# Patient Record
Sex: FEMALE | Race: WHITE | NOT HISPANIC OR LATINO | Employment: FULL TIME | URBAN - METROPOLITAN AREA
[De-identification: names, ages, dates, MRNs, and addresses within clinical notes are randomized per-mention and may not be internally consistent; named-entity substitution may affect disease eponyms.]

---

## 2017-05-16 ENCOUNTER — ALLSCRIPTS OFFICE VISIT (OUTPATIENT)
Dept: OTHER | Facility: OTHER | Age: 27
End: 2017-05-16

## 2017-05-16 DIAGNOSIS — R10.2 PELVIC AND PERINEAL PAIN: ICD-10-CM

## 2017-05-16 DIAGNOSIS — Z01.419 ENCOUNTER FOR GYNECOLOGICAL EXAMINATION WITHOUT ABNORMAL FINDING: ICD-10-CM

## 2017-05-16 DIAGNOSIS — Z11.3 ENCOUNTER FOR SCREENING FOR INFECTIONS WITH PREDOMINANTLY SEXUAL MODE OF TRANSMISSION: ICD-10-CM

## 2017-05-19 ENCOUNTER — GENERIC CONVERSION - ENCOUNTER (OUTPATIENT)
Dept: OTHER | Facility: OTHER | Age: 27
End: 2017-05-19

## 2017-05-19 LAB
A. VAGINALIS BY PCR (HISTORICAL): NOT DETECTED
A.VAGINALIS LOG (CELL/ML) (HISTORICAL): <3.25
BVAB 2 (HISTORICAL): NOT DETECTED
C. ALBICANS, DNA OR PCR (HISTORICAL): NOT DETECTED
CANDIDA GENUS (HISTORICAL): NOT DETECTED
CHLAMYDIA TRACHOMATIS BY MOL. METHOD (HISTORICAL): NOT DETECTED
GARDNERELLA BY MOL. METHOD (HISTORICAL): <3.25
GARDNERELLA BY MOL. METHOD (HISTORICAL): NOT DETECTED
GC BY MOL. METHOD (HISTORICAL): NOT DETECTED
HERPES SIMPLEX TYPING (HISTORICAL): NOT DETECTED
HERPES SIMPLEX TYPING (HISTORICAL): NOT DETECTED
LACTOBACILLUS (SPECIES) (HISTORICAL): DETECTED
LACTOBACILLUS (SPECIES) (HISTORICAL): NORMAL
MEGASPHAERA TYPE 1 (HISTORICAL): NOT DETECTED
MYCOPLASMA GENITALIUM BY MOLECULAR METHOD (HISTORICAL): NOT DETECTED
TRICHOMONAS (HISTORICAL): NOT DETECTED
UREAPLASMA (HISTORICAL): NOT DETECTED

## 2017-05-22 ENCOUNTER — HOSPITAL ENCOUNTER (OUTPATIENT)
Dept: RADIOLOGY | Facility: HOSPITAL | Age: 27
Discharge: HOME | End: 2017-05-22
Payer: COMMERCIAL

## 2017-05-22 DIAGNOSIS — R10.2 PELVIC AND PERINEAL PAIN: ICD-10-CM

## 2017-05-22 PROCEDURE — 76856 US EXAM PELVIC COMPLETE: CPT

## 2017-05-22 PROCEDURE — 76830 TRANSVAGINAL US NON-OB: CPT

## 2017-05-24 ENCOUNTER — GENERIC CONVERSION - ENCOUNTER (OUTPATIENT)
Dept: OTHER | Facility: OTHER | Age: 27
End: 2017-05-24

## 2017-05-24 ENCOUNTER — DOCTOR'S OFFICE (OUTPATIENT)
Dept: URBAN - METROPOLITAN AREA CLINIC 137 | Facility: CLINIC | Age: 27
Setting detail: OPHTHALMOLOGY
End: 2017-05-24
Payer: COMMERCIAL

## 2017-05-24 DIAGNOSIS — H52.13: ICD-10-CM

## 2017-05-24 PROCEDURE — 92014 COMPRE OPH EXAM EST PT 1/>: CPT | Performed by: OPHTHALMOLOGY

## 2017-05-24 ASSESSMENT — REFRACTION_CURRENTRX
OS_OVR_VA: 20/
OD_OVR_VA: 20/
OS_OVR_VA: 20/
OS_OVR_VA: 20/
OD_OVR_VA: 20/
OD_OVR_VA: 20/

## 2017-05-24 ASSESSMENT — REFRACTION_OUTSIDERX
OS_VA1: 20/20
OS_VA2: 20/
OU_VA: 20/20
OD_VA3: 20/
OS_VA3: 20/
OD_VA2: 20/
OS_SPHERE: -4.50
OD_SPHERE: -4.75
OS_AXIS: 90
OD_VA1: 20/20
OS_CYLINDER: +0.50

## 2017-05-24 ASSESSMENT — REFRACTION_MANIFEST
OS_VA1: 20/20
OU_VA: 20/
OS_VA3: 20/
OD_CYLINDER: SPH
OD_VA1: 20/20
OD_VA2: 20/
OD_VA3: 20/
OS_VA2: 20/
OS_AXIS: 095
OS_CYLINDER: +1.00
OD_VA2: 20/
OS_SPHERE: -4.75
OS_VA2: 20/
OD_VA3: 20/
OS_VA3: 20/
OD_SPHERE: -4.75
OD_VA1: 20/
OS_VA1: 20/
OU_VA: 20/

## 2017-05-24 ASSESSMENT — REFRACTION_AUTOREFRACTION
OS_AXIS: 102
OS_CYLINDER: +1.00
OD_SPHERE: -4.25
OD_CYLINDER: SPH
OS_SPHERE: -4.50

## 2017-05-24 ASSESSMENT — CONFRONTATIONAL VISUAL FIELD TEST (CVF)
OD_FINDINGS: FULL
OS_FINDINGS: FULL

## 2017-05-24 ASSESSMENT — VISUAL ACUITY
OS_BCVA: 20/20
OD_BCVA: 20/20

## 2017-05-24 ASSESSMENT — SPHEQUIV_DERIVED
OS_SPHEQUIV: -4.25
OS_SPHEQUIV: -4

## 2017-05-31 ENCOUNTER — ALLSCRIPTS OFFICE VISIT (OUTPATIENT)
Dept: OTHER | Facility: OTHER | Age: 27
End: 2017-05-31

## 2017-06-06 ENCOUNTER — ALLSCRIPTS OFFICE VISIT (OUTPATIENT)
Dept: OTHER | Facility: OTHER | Age: 27
End: 2017-06-06

## 2017-09-08 ENCOUNTER — ALLSCRIPTS OFFICE VISIT (OUTPATIENT)
Dept: OTHER | Facility: OTHER | Age: 27
End: 2017-09-08

## 2017-09-08 DIAGNOSIS — N92.1 EXCESSIVE AND FREQUENT MENSTRUATION WITH IRREGULAR CYCLE: ICD-10-CM

## 2017-09-08 DIAGNOSIS — N94.6 DYSMENORRHEA: ICD-10-CM

## 2017-10-26 NOTE — PROGRESS NOTES
Assessment  1  Menorrhagia with irregular cycle (626 2) (N92 1)   2  Dysmenorrhea (625 3) (N94 6)    Plan  Dysmenorrhea, Menorrhagia with irregular cycle    · (1) T4, FREE; Status:Active; Requested CEE:[id number];    Perform:[facility]; WLX:[id number]; Ordered; For:Dysmenorrhea, Menorrhagia with irregular cycle; Ordered By:Mariana Sheppard;   · (1) TSH; Status:Active; Requested GKC:28RZN8415;    Perform:[facility]; DLH:[id number]; Ordered; For:Dysmenorrhea, Menorrhagia with irregular cycle; Ordered By:Dinh Sheppard; Menorrhagia with irregular cycle    · Balziva 0 4-35 MG-MCG Oral Tablet; Take one tablet po daily at the same time  Avoid placebo pill  x 2 packs then take all pills including placebos in thrid pill pack   Rx By: Arnoldo Bianchi; Dispense: 0 Days ; #:3 X 28 Tablet Disp Pack; Refill: 0;For: Menorrhagia with irregular cycle; ADRIAN = N; Verified Transmission to [facility]/PHARMACY #[id number] Last Updated By: System, [employer]Script[employer]; 9/8/2017 2:32:18 PM   · Follow-up PRN Evaluation and Treatment  Follow-up  Status: Complete  Done:  83TPO3019   Ordered; For: Menorrhagia with irregular cycle; Ordered By: Arnoldo Bianchi Performed:  Due: 51MDN1931   · Follow-up visit in 3 months Evaluation and Treatment  Follow-up  Status: Hold For -  Scheduling  Requested for: 42Izg9435   Ordered; For: Menorrhagia with irregular cycle; Ordered By: Arnoldo Bianchi Performed:  Due: 17EPN1124    Discussion/Summary  Discussion Summary:   Sign records release to get copy of her most recent [facility] ER visit  TSH, FT4  Start balziva today-discussed extended cycling  Ibuprofen 600 mg by mouth with onset of bleeding or cramping, whichever is first  Take second dose of ibuprofen 400 mg by mouth with food and repeat every 6 hours x 3 days  RTO in 3 months  Complete HIV, RPR that were ordered previously  Chief Complaint  Chief Complaint Free Text Note Form: pt here for heavy periods with severe cramping   pt states periods are usually heavier the first 2 days of cycle and gets lighter towards the end or completely stops  It is becoming an issue with her job because she has to call off or leave due to the pain  History of Present Illness  HPI: Patient here for a problem visit  Typically monthly menses but not always when expected  Bleeds x 2-7 days  Heavy flow x 2 days changing a saturated pad and clothes 5 times per day  Admits to cramping rating pain 10/10 with no relief from a list of medications  States oxycodone with tylenol is the only medication that relieves the pain  After the first two heavy flow days the flow lessens and pain ends  Last sexually active 2 months ago  Never completed the HIV and RPR testing because she lost the lab slip  Admits to clear to milky white vaginal discharge x 1 month  No malodor  Denies pelvic pain when not menstruating  Was seen at [facility] ER 9/5/17 on day one of her menses  Was told her blood count was normal and the meds they gave her and prescribed her (tramadol) did not relieve her pain  Active Problems  1  Bacteriuria (791 9) (R82 71)   2  Encounter for annual routine gynecological examination (V72 31) (Z01 419)   3  HSV infection (054 9) (B00 9)   4  Leukorrhea (623 5) (N89 8)   5  Pelvic pain in female (625 9) (R10 2)   6  Screening for STD (sexually transmitted disease) (V74 5) (Z11 3)   7  Sprain of left wrist, initial encounter (842 00) (S63 502A)   8  UTI (urinary tract infection), uncomplicated (848 8) (E35 4)   9  Vaginal discharge (623 5) (N89 8)    Past Medical History  1  History of Bacterial vaginosis (616 10,041 9) (N76 0,B96 89)   2  History of Blood type A+ (V49 89) (Z67 10)   3  History of Chlamydia (079 98) (A74 9)   4  History of gonorrhea (V12 09) (Z86 19)   5  History of herpes simplex infection (V12 09) (Z86 19)   6  History of herpes simplex type 2 infection (V12 09) (Z86 19)   7   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)  Active Problems And Past Medical History Reviewed: The active problems and past medical history were reviewed and updated today  Surgical History  1  History of Oral Surgery Tooth Extraction [place] Tooth   2  History of Surgically Induced  - By Dilation And Curettage   3  History of Tubal Ligation  Surgical History Reviewed: The surgical history was reviewed and updated today  Family History  Mother    1  Family history of malignant neoplasm of breast (V16 3) (Z80 3)  Father    2  Family history of hypertension (V17 49) (Z82 49)  Son    3  Family history of asthma (V17 5) (Z82 5)  Family History Reviewed: The family history was reviewed and updated today  Social History   · Alcohol drinker (V49 89) (Z78 9)   · Daily caffeine consumption, 1 serving a day   · Exercises 3 to 4 times per week (V49 89) (Z78 9)   · Ex-smoker (V15 82) (T02 490)   · No drug use   · History of Smoker (305 1) (F17 200)   · Three children  Social History Reviewed: The social history was reviewed and updated today  Current Meds   1  ValACYclovir HCl - 500 MG Oral Tablet; TAKE 1 TABLET TWICE DAILY; Therapy: 46ZDV9773 to (Evaluate:55Trz3668)  Requested for: 26AKJ9034; Last   Rx:93Hem5853 Ordered   2  ValACYclovir HCl - 500 MG Oral Tablet; Take one tablet po daily; Therapy: 47LFV6414 to (Last Rx:77Wnm3138)  Requested for: 00WCG5265 Ordered   3  ValACYclovir HCl - 500 MG Oral Tablet; Take one tablet po daily; Therapy: 62TTX7140 to (Last TC:38WJY5850)  Requested for: 46HMZ3630 Ordered  Medication List Reviewed: The medication list was reviewed and updated today  Allergies  1  aspirin    Vitals  Vital Signs    Recorded: 2017 02:02PM   Heart Rate 60   Systolic 963   Diastolic 62   Height 5 ft    Weight 165 lb    BMI Calculated 32 22   BSA Calculated 1 72   O2 Saturation 98   LMP [id number]     Physical Exam    Constitutional   General appearance: No acute distress, well appearing and well nourished      Genitourinary   External genitalia: Normal and no lesions appreciated  Vagina: Abnormal  -- bloody discharge  Urethra: Normal     Urethral meatus: Normal     Bladder: Normal, soft, non-tender and no prolapse or masses appreciated  Cervix: Normal, no palpable masses  -- smooth  Uterus: Normal, non-tender, not enlarged, and no palpable masses  -- AV  Adnexa/parametria: Normal, non-tender and no fullness or masses appreciated  -- NT NP  Anus, perineum, and rectum: Normal sphincter tone, no masses, and no prolapse  Visually normal   Psychiatric   Orientation to person, place, and time: Normal     Mood and affect: Normal        Signatures   Electronically signed by :  LEN Bustillos; Sep  8 2017  2:36PM EST                       (Author)    Electronically signed by : LORRI Abraham ; Sep 10 2017  5:39PM EST                       (Author)

## 2017-12-08 ENCOUNTER — ALLSCRIPTS OFFICE VISIT (OUTPATIENT)
Dept: OTHER | Facility: OTHER | Age: 27
End: 2017-12-08

## 2017-12-09 NOTE — PROGRESS NOTES
Assessment   1  Menorrhagia with irregular cycle (626 2) (N92 1)   2  Dysmenorrhea (625 3) (N94 6)   3  HSV infection (054 9) (B00 9)    Plan   Dysmenorrhea    · Follow-up PRN Evaluation and Treatment  Follow-up  Status: Complete  Done:    21ZRW2519   Ordered; For: Dysmenorrhea; Ordered By: Rohit Perrin Performed:  Due: 53JZH5368   · Follow-up visit in 5 months Evaluation and Treatment  Follow-up  Status: Complete     Done: 09BBI6382   Ordered; For: Dysmenorrhea; Ordered By: Rohit Perrin Performed:  Due: 50BYE4886; Last Updated By: Yannick Mcdaniel; 12/8/2017 9:33:30 AM  HSV infection, Leukorrhea    · ValACYclovir HCl - 500 MG Oral Tablet (Valtrex); TAKE 1 TABLET DAILY   Rx By: Rohit Perrin; Dispense: 30 Days ; #:30 Tablet; Refill: 6;For: HSV infection, Leukorrhea; ADRIAN = N; Verified Transmission to [facility]/PHARMACY #[id number] Last Updated By: System, SureScripts; 12/8/2017 9:28:36 AM  Menorrhagia with irregular cycle    · Balziva 0 4-35 MG-MCG Oral Tablet; Take one tablet po daily at the same time  Avoid placebo pill  x 2 packs then take all pills including placebos in thrid pill pack   Rx By: Rohit Perrin; Dispense: 0 Days ; #:1 X 28 Tablet Disp Pack; Refill: 5;For: Menorrhagia with irregular cycle; ADRIAN = N; Verified Transmission to [facility]/PHARMACY #2254 Msg to Pharmacy: Patient will need refill prior to the end of the pill pack due to extended cycling as stated above ; Last Updated By: System [employer]; 12/8/2017 9:28:35 AM    Discussion/Summary   Discussion Summary:    Take suppressive HSV treatment of valacyclovir  Instructions given  Continue ASMITA extended cycling  Note to pharmacy to allow her to fill medication prior to end of the month  RTO for annual visit  Counseling Documentation With Imm: The patient was counseled regarding diagnostic results,-- instructions for management,-- prognosis,-- patient and family education,-- importance of compliance with treatment   total time of encounter was 18 minutes-- and-- 11 minutes was spent counseling  Chief Complaint   Chief Complaint Free Text Note Form: Pt here for 3 month follow up on her Severino Morley for heavy bleeding  History of Present Illness   HPI: Patient here for birth control follow up  States she tried to extended cycle on balziva but the pharmacy would not fill her pill pack early and she ended up bleeding early each month  Monthly menses x 5-12 day  Intermenstrual frequent bleeding  Cramping with each bleed fucking killer cramps  No meds taken  It doesnât help so why bother  Normal TSH 9/17  Normal vaginal discharge when not bleeding  Recent HSV outbreaks and requesting refill on valacyclovir  Partner recently tested positive for HSV2 which is very upsetting to her  Review of Systems   Focused-Female:      Constitutional: No fever, no chills, feels well, no tiredness, no recent weight gain or loss  Genitourinary: dysmenorrhea-- and-- unexplained vaginal bleeding, but-- no complaints of dysuria, no incontinence, no pelvic pain, no dysmenorrhea, no vaginal discharge or abnormal vaginal bleeding,-- no dysuria-- and-- no vaginal discharge  Musculoskeletal: no complaints of arthralgia, no myalgia, no joint swelling or stiffness, no limb pain or swelling  Integumentary: no complaints of skin rash or lesion, no itching or dry skin, no skin wounds  ROS Reviewed:    ROS reviewed  Active Problems   1  Bacteriuria (791 9) (R82 71)   2  Dysmenorrhea (625 3) (N94 6)   3  Encounter for annual routine gynecological examination (V72 31) (Z01 419)   4  HSV infection (054 9) (B00 9)   5  Leukorrhea (623 5) (N89 8)   6  Menorrhagia with irregular cycle (626 2) (N92 1)   7  Pelvic pain in female (625 9) (R10 2)   8  Screening for STD (sexually transmitted disease) (V74 5) (Z11 3)   9  Sprain of left wrist, initial encounter (842 00) (S63 502A)   10  UTI (urinary tract infection), uncomplicated (950 9) (I03 5)   11   Vaginal discharge (623  5) (N89 8)    Past Medical History   1  History of Bacterial vaginosis (616 10,041 9) (N76 0,B96 89)   2  History of Blood type A+ (V49 89) (Z67 10)   3  History of Chlamydia (079 98) (A74 9)   4  History of gonorrhea (V12 09) (Z86 19)   5  History of herpes simplex infection (V12 09) (Z86 19)   6  History of herpes simplex type 2 infection (V12 09) (Z86 19)   7  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)  Active Problems And Past Medical History Reviewed: The active problems and past medical history were reviewed and updated today  Surgical History   1  History of Oral Surgery Tooth Extraction [place] Tooth   2  History of Surgically Induced  - By Dilation And Curettage   3  History of Tubal Ligation    Family History   Mother    1  Family history of malignant neoplasm of breast (V16 3) (Z80 3)  Father    2  Family history of hypertension (V17 49) (Z82 49)  Son    3  Family history of asthma (V17 5) (Z82 5)    Social History    · Alcohol drinker (V49 89) (Z78 9)   · Daily caffeine consumption, 1 serving a day   · Exercises 3 to 4 times per week (V49 89) (Z78 9)   · Ex-smoker (V15 82) (D49 267)   · No drug use   · History of Smoker (305 1) (F17 200)   · Three children  Social History Reviewed: The social history was reviewed and updated today  Current Meds    1  Balziva 0 4-35 MG-MCG Oral Tablet; Take one tablet po daily at the same time  Avoid     placebo pill  x 2 packs then take all pills including placebos in thrid pill pack; Therapy: 45EYL3826 to (Last Rx:37Zjx0609)  Requested for: 04Rlm5027 Ordered   2  ValACYclovir HCl - 500 MG Oral Tablet; TAKE 1 TABLET TWICE DAILY; Therapy: 62CIH8368 to (Evaluate:73Xqj1744)  Requested for: 48KWY8214; Last     Rx:62Peh8225 Ordered  Medication List Reviewed: The medication list was reviewed and updated today         Allergies   1  aspirin    Vitals   Vital Signs    Recorded: 70FUC6672 08:54AM   Heart Rate 70   Systolic 841, LUE, Sitting Diastolic 68, LUE, Sitting   Height 5 ft    Weight 170 lb    BMI Calculated 33 2   BSA Calculated 1 74   LMP 25PZH4654     Physical Exam   deferred         Future Appointments      Date/Time Provider Specialty Site   05/24/2018 02:40 PM Rajeev Mosqueda[address] [facility]  [facility]    Electronically signed by : LEN Ayers; Dec  8 2017  9:24AM EST                       (Author)     Electronically signed by : LEN Ayers; Dec  8 2017  9:29AM EST                       (Author)     Electronically signed by : LORRI Liriano ; Dec  8 2017  3:27PM EST                       (Author)     Electronically signed by :  LEN Ayers; Dec 27 2017  3:59PM EST                       (Author)     Electronically signed by : LORRI Liriano ; Dec 27 2017  5:26PM EST                       (Author)

## 2018-01-10 NOTE — RESULT NOTES
Verified Results  (B) C/V/U EXPANDED W/O PAP W/O HPV PLUS (NON-[place]) 66ZJQ3451 03:41PM [place]      Test Name Result Flag Reference   Chlamyd  Trach by Paula Not Detected     SPECIMEN SOURCE:      C/V/U EXPANDED W/O PAP W/O HPV PLUS (NON-[place])  CLINICAL INFORMATION: Provided Diagnosis Codes: N89 8  MOLECULAR               INTERPRETATION:       Results are indeterminate for Bacterial Vaginosis                         (BV)  MOLECULAR COMMENT:    Both Lactobacillus and anaerobic bacterial DNA                         (from G  vaginalis, and/or A  vaginae,                         Megasphaera, BVAB2)   is detected  There is no                         predominance of either lactobacillus or tested                         anaerobic bacterial DNA  BV cannot be excluded  GC BY MULTIPLEX PCR Not Detected     Ureaplasma By Multiplex PCR Detected A    Mycoplasma Genitalium Multiplex Not Detected     Trichomonas By Mult PCR Not Detected     Herpes Simplex I Multi  Not Detected     Herpes II by Multiplex Not Detected     CANDIDA GENUS Detected A    C ALBICANS BY PCR Detected A    G  VAGINALIS BY PCR Detected A    A  VAGINALIS BY PCR Not Detected     LACTOBACILLUS (SPECIES) BY PCR Detected     MEGASPHAERA TYPE 1 PCR Not Detected     BVAB2 BY PCR Not Detected     LACTOBACILLUS SP  LOG (CELLS/mL) 3 25 - 5 25     G  VAGINALS BY RT-PCR 3 25 - 5 25 A    A VAGINALIS LOG (CELL/ML) <3 25         Plan  Vaginal discharge    · Azithromycin 500 MG Oral Tablet; Take both tablets po at once   · Gyne-Lotrimin 1 % Vaginal Cream; INSERT 1 APPLICATORFUL  INTRAVAGINALLY AT BEDTIME NIGHTLY x 7 nights   · MetroNIDAZOLE 500 MG Oral Tablet; TAKE 1 TABLET TWICE DAILY UNTIL  FINISHED  No ETOH x 8 days

## 2018-01-11 NOTE — RESULT NOTES
Verified Results  (B) C/V/U EXPANDED W/O PAP W/O HPV PLUS (NON-NY) [id number] 12:00AM Mike Davis     Test Name Result Flag Reference   Atopobium Vaginae (NON-[place]) Not Detected     SPECIMEN SOURCE:      C/V/U EXPANDED W/O PAP W/O HPV PLUS (NON-[place])  CLINICAL INFORMATION: Provided Diagnosis Codes: N89 8  MOLECULAR               INTERPRETATION:       Results do not favor Bacterial Vaginosis (BV)  MOLECULAR COMMENT:    Lactobacillus DNA is detected  No anaerobic                          bacterial DNA (from G  vaginalis, A  vaginae,                         Megasphaera, BVAB2) is detected  Chlamydia Trachomatis by Multiplex PCR Not Detected     GC BY MULTIPLEX PCR Not Detected     Ureaplasma By Multiplex PCR Detected A    Mycoplasma Genitalium By Multiplex PCR Not Detected     Trichomonas By Multiplex PCR Not Detected     Gardnerella by Multiplex PCR (NON-[place]) Not Detected     Herpes Simplex I by Multiplex (Non [place]) Not Detected     Herpes Simplex II by Multiplex (Non [place]) Not Detected     BVAB2 [place] Not Detected     LACTOBACILLUS SPECIES NON-[place] Detected     MEGASPHAERA TYPE-1 Not Detected     CANDIDA GENUS Not Detected     C ALBICANS BY PCR Not Detected     ATOPOBIUM VAGINAE (NON-[place]) (4,6,7)  CHLAMYDIA TRACHOMATIS BY MULTIPLEX PCR (1,6,7)  GC BY MULTIPLEX PCR (1,6,7)  UREAPLASMA BY MULTIPLEX PCR (1,2,6,7)  MYCOPLASMA GENITALIUM BY MULTIPLEX PCR (1,2,6,7)  TRICHOMONAS BY MULTIPLEX PCR (1,6,7)  GARDNERELLA BY MULTIPLEX PCR (NON-[place]) (4,6,7)  HERPES SIMPLEX I BY MULTIPLEX (NON [place]) (1,6,7)  HERPES SIMPLEX II BY MULTIPLEX (NON [place]) (1,6,7)  BVAB2 NON-[place] (4,6,7)  LACTOBACILLUS SPECIES NON-[place] (4,6,7)  MEGASPHAERA TYPE-1 (4,6,7)  CANDIDA GENUS (3,5,6,7)  C ALBICANS BY PCR (3,5,6,7)  (1)  This test is an in vitro test for the detection of sexually transmitted   infections (STIs) in clinical specimens    The test utilizes   amplification of target DNA by the Polymerase Chain Reaction (PCR)   based on dual priming oligonucleotide technology and detects STI DNA  (2)  Mycoplasma genitalium is a causative agent of Nongonoccocal urethritis   (JURGEN) in men and cervicitis in women  Ureaplasma spp  is associated   with JURGEN in men  (3)  The common causes of vulvovaginal candidiasis include Candida albicans,   Candida tropicalis, Candida glabrata, Candida parapsilosis, Candida   dubliniensis, Gretchen krusei and a few other species that colonize the   vagina  In several studies, non-albicans species such as tropicalis,   krusei and glabrata have demonstrated resistance to fluconazole and   other related azole antifungals  Using comparable data from another   assay (BD [employer] VPIII microbial identification test), molecular   testing using liquid-based cytology specimens in general for Candida   may not be as sensitive as culture or visual identification  (4)  Target DNA for these organisms was amplified in a multiplex polymerase   chain reaction (PCR) and amplicons were  and identified by   capillary electrophoresis  (5)  This test utilizes amplification of target DNA for Candida genus and   selected Candida species by Polymerase Chain Reaction (PCR)  The limit   of detection for these assays is 0 9 pg for Candida genus, 9 pg for C    albicans, 10 pg for C  dubliniensis,  5 pg for C  glabrata, 20 pg for   C  krusei, 20 pg for C  parapsilosis, and 5 pg for C  tropicalis  (6)  This test was evaluated and its performance characteristics determined   by [facility]  It has not been cleared or approved by   the [place]  Food and Drug Administration  The FDA has determined that   such clearance or approval is not necessary  [facility]   is certified under the Clinical Laboratory Improvement Act of 1988   (CLIA) as qualified to perform high complexity clinical testing   (7)  Results should be interpreted together with past and current clinical   and laboratory data         Plan  Vaginal discharge    · Azithromycin 500 MG Oral Tablet;  Take both tablets po at once

## 2018-01-13 VITALS
HEIGHT: 60 IN | HEART RATE: 60 BPM | WEIGHT: 165 LBS | HEIGHT: 60 IN | SYSTOLIC BLOOD PRESSURE: 102 MMHG | BODY MASS INDEX: 35.34 KG/M2 | SYSTOLIC BLOOD PRESSURE: 106 MMHG | OXYGEN SATURATION: 98 % | BODY MASS INDEX: 32.39 KG/M2 | HEART RATE: 66 BPM | OXYGEN SATURATION: 98 % | DIASTOLIC BLOOD PRESSURE: 64 MMHG | WEIGHT: 180 LBS | DIASTOLIC BLOOD PRESSURE: 62 MMHG

## 2018-01-13 NOTE — RESULT NOTES
Verified Results  (B) C/V/U EXPANDED W/O PAP W/O HPV PLUS (NON-[place]) [id number] 11:32AM Rajeev Mosqueda     Test Name Result Flag Reference   Herpes Simplex I Multi  Not Detected     Herpes II by Multiplex Not Detected     [facility]yd  [facility]ach [facility] Not Detected     SPECIMEN SOURCE:      C/V/U EXPANDED W/O PAP W/O HPV PLUS (NON-[place])  CLINICAL INFORMATION: Provided Diagnosis Codes: Z11 3,N89 8  MOLECULAR               INTERPRETATION:       Results do not favor Bacterial Vaginosis (BV)  MOLECULAR COMMENT:    Lactobacillus DNA is detected  No anaerobic                          bacterial DNA (from G  vaginalis, A  vaginae,                         Megasphaera, BVAB2) is detected  GC BY MULTIPLEX PCR Not Detected     Ureaplasma By Multiplex PCR Not Detected     Mycoplasma Genitalium Multiplex Not Detected     Trichomonas By Mult PCR Not Detected     CANDIDA GENUS Not Detected     C ALBICANS BY PCR Not Detected     G  VAGINALIS BY PCR Not Detected     A  VAGINALIS BY PCR Not Detected     LACTOBACILLUS (SPECIES) BY PCR Detected     MEGASPHAERA TYPE 1 PCR Not Detected     BVAB2 BY PCR Not Detected     LACTOBACILLUS SP  LOG (CELLS/mL) 3 25 - 5 25     G  VAGINALS BY RT-PCR <3 25     A VAGINALIS LOG (CELL/ML) <3 25     CHLAMYDIA TRACHOMATIS BY MULTIPLEX PCR (1,6,7)  GC BY MULTIPLEX PCR (1,6,7)  UREAPLASMA BY MULTIPLEX PCR (1,2,6,7)  MYCOPLASMA GENITALIUM BY MULTIPLEX PCR (1,2,6,7)  TRICHOMONAS BY MULTIPLEX PCR (1,6,7)  HERPES SIMPLEX I BY MULTIPLEX (NON [place]) (1,6,7)  HERPES SIMPLEX II BY MULTIPLEX (NON [place]) (1,6,7)  CANDIDA GENUS (3,4,6,7)  C ALBICANS BY PCR (3,4,6,7)  G  VAGINALIS BY RT-PCR (5,6,8)  A  VAGINALIS BY RT-PCR (5,6,8)  LACTOBACILLUS (SPECIES) BY PCR (5,6,8)  MEGASPHAERA TYPE 1 BY RT PCR (5,6,8)  BVAB2 BY RT-PCR (5,6,8)  LACTOBACILLUS SP  LOG (CELLS/mL) (5,6,8)  G  VAGINALIS LOG (CELLS/mL) (5,6,8)  A   VAGINALIS LOG (CELLS/ML) (5,6,8)  (1)  This test is an in vitro test for the detection of sexually transmitted infections (STIs) in clinical specimens  The test utilizes   amplification of target DNA by the Polymerase Chain Reaction (PCR)   based on dual priming oligonucleotide technology and detects STI DNA  (2)  Mycoplasma genitalium is a causative agent of Nongonoccocal urethritis   (JURGEN) in men and cervicitis in women  Ureaplasma spp  is associated   with JURGEN in men  (3)  The common causes of vulvovaginal candidiasis include Candida albicans,   Candida tropicalis, Candida glabrata, Candida parapsilosis, Candida   dubliniensis, Gretchen krusei and a few other species that colonize the   vagina  In several studies, non-albicans species such as tropicalis,   krusei and glabrata have demonstrated resistance to fluconazole and   other related azole antifungals  Using comparable data from another   assay (BD [employer] VPIII microbial identification test), molecular   testing using liquid-based cytology specimens in general for Candida   may not be as sensitive as culture or visual identification  (4)  This test utilizes amplification of target DNA for Candida genus and   selected Candida species by Polymerase Chain Reaction (PCR)  The limit   of detection in the genus panel is 100 copies for Candida genus and for   C  albicans  The limit of detection in the species panel is 10 copies   for C  albicans, C  dubliniensis, C  glabrata, C  krusei, C    parapsilosis, and C  tropicalis  Saccharomyces cerevisiae shares   significant homology with Candida genus and may cross react and give a   presumptive positive result; however, S  cerevisiae is rarely seen in   the cervicovaginal microbiome, but may be seen in the GI tract  Saccharomyces cerevisiae may have an interfering reaction with C    albicans in the species panel, resulting in lower amplification of the   C  albicans    (5)  This assay utilizes quantitative real time PCR (multiple detection   temperatures technology, MuDT(TM)) in liquid cytology specimens to quantify the levels of nucleic acid of Lactobacillus species, G    vaginalis and A  vaginae, and also detects nucleic acid from several   obligate anaerobes associated with Bacterial Vaginosis (BV)   (6)  This test was evaluated and its performance characteristics determined   by [email or web address]  It has not been cleared or approved by   the [place]  Food and Drug Administration  The FDA has determined that   such clearance or approval is not necessary  [email or web address]   is certified under the Clinical Laboratory Improvement Act of 1988   (CLIA) as qualified to perform high complexity clinical testing   (7)  Results should be interpreted together with past and current clinical   and laboratory data  (8)  Results should be interpreted in light of current and past laboratory   data and symptomatology

## 2018-01-14 VITALS
BODY MASS INDEX: 34.75 KG/M2 | DIASTOLIC BLOOD PRESSURE: 65 MMHG | HEIGHT: 60 IN | HEART RATE: 66 BPM | SYSTOLIC BLOOD PRESSURE: 100 MMHG | WEIGHT: 177 LBS

## 2018-01-16 NOTE — MISCELLANEOUS
Provider Comments  Provider Comments:   Called to RS missed appt  Patient will call back to reschedule appt after she has her blood work done  JF      Signatures   Electronically signed by :  LEN Sanchez; May 31 2017 10:22AM EST                       (Author)

## 2018-01-23 VITALS
HEIGHT: 60 IN | HEART RATE: 70 BPM | SYSTOLIC BLOOD PRESSURE: 126 MMHG | WEIGHT: 170 LBS | BODY MASS INDEX: 33.38 KG/M2 | DIASTOLIC BLOOD PRESSURE: 68 MMHG

## 2018-02-08 ENCOUNTER — TELEPHONE (OUTPATIENT)
Dept: GYNECOLOGY | Facility: CLINIC | Age: 28
End: 2018-02-08

## 2018-02-14 ENCOUNTER — TELEPHONE (OUTPATIENT)
Dept: GYNECOLOGY | Facility: CLINIC | Age: 28
End: 2018-02-14

## 2018-04-18 ENCOUNTER — OPTICAL OFFICE (OUTPATIENT)
Dept: URBAN - METROPOLITAN AREA CLINIC 146 | Facility: CLINIC | Age: 28
Setting detail: OPHTHALMOLOGY
End: 2018-04-18
Payer: COMMERCIAL

## 2018-04-18 DIAGNOSIS — H52.13: ICD-10-CM

## 2018-04-18 PROCEDURE — S0500 DISPOS CONT LENS: HCPCS | Performed by: OPHTHALMOLOGY

## 2018-04-20 DIAGNOSIS — Z30.41 ENCOUNTER FOR SURVEILLANCE OF CONTRACEPTIVE PILLS: Primary | ICD-10-CM

## 2018-04-20 RX ORDER — NORETHINDRONE AND ETHINYL ESTRADIOL 0.4-0.035
KIT ORAL
Qty: 28 TABLET | Refills: 0 | Status: SHIPPED | OUTPATIENT
Start: 2018-04-20 | End: 2018-05-21 | Stop reason: SDUPTHER

## 2018-05-21 DIAGNOSIS — Z86.19 HISTORY OF HERPES GENITALIS: Primary | ICD-10-CM

## 2018-05-21 DIAGNOSIS — Z30.41 ENCOUNTER FOR SURVEILLANCE OF CONTRACEPTIVE PILLS: ICD-10-CM

## 2018-05-21 RX ORDER — VALACYCLOVIR HYDROCHLORIDE 500 MG/1
TABLET, FILM COATED ORAL
Qty: 90 TABLET | Refills: 0 | Status: SHIPPED | OUTPATIENT
Start: 2018-05-21 | End: 2019-06-17

## 2018-05-21 NOTE — TELEPHONE ENCOUNTER
Patient has appointment with shailesh on Thursday for her yv  Just ran out of her birth control pills  Would like to know if she could have one refill on that and a refill on her valcyclovir?

## 2018-05-24 ENCOUNTER — ANNUAL EXAM (OUTPATIENT)
Dept: GYNECOLOGY | Facility: CLINIC | Age: 28
End: 2018-05-24
Payer: COMMERCIAL

## 2018-05-24 VITALS
HEART RATE: 48 BPM | HEIGHT: 63 IN | SYSTOLIC BLOOD PRESSURE: 102 MMHG | DIASTOLIC BLOOD PRESSURE: 60 MMHG | WEIGHT: 171.4 LBS | BODY MASS INDEX: 30.37 KG/M2

## 2018-05-24 DIAGNOSIS — N92.6 IRREGULAR MENSES: ICD-10-CM

## 2018-05-24 DIAGNOSIS — R92.2 BREAST DENSITY: ICD-10-CM

## 2018-05-24 DIAGNOSIS — B00.9 HSV INFECTION: ICD-10-CM

## 2018-05-24 DIAGNOSIS — Z01.411 ENCNTR FOR GYN EXAM (GENERAL) (ROUTINE) W ABNORMAL FINDINGS: Primary | ICD-10-CM

## 2018-05-24 DIAGNOSIS — N94.6 DYSMENORRHEA: ICD-10-CM

## 2018-05-24 PROBLEM — R92.30 BREAST DENSITY: Status: ACTIVE | Noted: 2018-05-24

## 2018-05-24 PROCEDURE — 3725F SCREEN DEPRESSION PERFORMED: CPT | Performed by: NURSE PRACTITIONER

## 2018-05-24 PROCEDURE — 99395 PREV VISIT EST AGE 18-39: CPT | Performed by: NURSE PRACTITIONER

## 2018-05-24 RX ORDER — VALACYCLOVIR HYDROCHLORIDE 500 MG/1
500 TABLET, FILM COATED ORAL DAILY
Qty: 30 TABLET | Refills: 11 | Status: SHIPPED | OUTPATIENT
Start: 2018-05-24 | End: 2018-06-18 | Stop reason: SDUPTHER

## 2018-05-24 RX ORDER — VARENICLINE TARTRATE 1 MG/1
1 TABLET, FILM COATED ORAL 2 TIMES DAILY
COMMUNITY
End: 2018-10-18 | Stop reason: ALTCHOICE

## 2018-05-24 NOTE — PATIENT INSTRUCTIONS
Pap every 3 year if normal-due, STI testing as indicated, exercise most days of week, appropriate diet and hydration, Calcium 1000mg + 600 vit D daily, birth control as directed (ACHES reviewed)  Extended cycling  Benefits, risks and alternatives discussed/reviewed  Annual mammogram starting at age 34, monthly BSE  Kegels 20 times twice daily  RTO for pap and cultures     Pelvic US-req given  Right breast US-req given  Complete TSH, FT4

## 2018-06-13 ENCOUNTER — DOCTOR'S OFFICE (OUTPATIENT)
Dept: URBAN - METROPOLITAN AREA CLINIC 137 | Facility: CLINIC | Age: 28
Setting detail: OPHTHALMOLOGY
End: 2018-06-13
Payer: COMMERCIAL

## 2018-06-13 ENCOUNTER — HOSPITAL ENCOUNTER (OUTPATIENT)
Dept: ULTRASOUND IMAGING | Facility: HOSPITAL | Age: 28
Discharge: HOME | End: 2018-06-13
Payer: COMMERCIAL

## 2018-06-13 DIAGNOSIS — N94.6 DYSMENORRHEA: ICD-10-CM

## 2018-06-13 DIAGNOSIS — N92.6 IRREGULAR MENSES: ICD-10-CM

## 2018-06-13 DIAGNOSIS — H52.13: ICD-10-CM

## 2018-06-13 PROCEDURE — 76830 TRANSVAGINAL US NON-OB: CPT

## 2018-06-13 PROCEDURE — 92014 COMPRE OPH EXAM EST PT 1/>: CPT | Performed by: OPHTHALMOLOGY

## 2018-06-13 PROCEDURE — 76856 US EXAM PELVIC COMPLETE: CPT

## 2018-06-13 ASSESSMENT — REFRACTION_OUTSIDERX
OS_VA1: 20/20
OS_SPHERE: -4.50
OD_VA1: 20/20
OS_VA2: 20/
OD_SPHERE: -4.75
OU_VA: 20/20
OS_VA2: 20/20(J1+)
OS_VA1: 20/20
OS_AXIS: 095
OD_VA1: 20/20
OS_VA3: 20/
OD_VA2: 20/20(J1+)
OD_VA3: 20/
OS_VA3: 20/
OD_VA3: 20/
OS_CYLINDER: +0.75
OS_CYLINDER: +0.50
OS_AXIS: 90
OS_SPHERE: -4.50
OD_VA2: 20/
OU_VA: 20/20
OD_SPHERE: -4.50
OD_CYLINDER: SPH

## 2018-06-13 ASSESSMENT — REFRACTION_MANIFEST
OS_VA3: 20/
OS_VA1: 20/
OD_VA3: 20/
OS_VA2: 20/
OU_VA: 20/
OD_VA2: 20/
OD_VA1: 20/

## 2018-06-13 ASSESSMENT — CONFRONTATIONAL VISUAL FIELD TEST (CVF)
OS_FINDINGS: FULL
OD_FINDINGS: FULL

## 2018-06-14 ASSESSMENT — REFRACTION_CURRENTRX
OD_OVR_VA: 20/
OS_OVR_VA: 20/
OS_OVR_VA: 20/
OD_OVR_VA: 20/
OD_OVR_VA: 20/
OS_OVR_VA: 20/

## 2018-06-14 ASSESSMENT — SPHEQUIV_DERIVED: OS_SPHEQUIV: -4.25

## 2018-06-14 ASSESSMENT — VISUAL ACUITY
OD_BCVA: 20/20
OS_BCVA: 20/20

## 2018-06-14 ASSESSMENT — REFRACTION_AUTOREFRACTION
OS_CYLINDER: +1.00
OS_AXIS: 095
OD_CYLINDER: SPH
OD_SPHERE: -4.00
OS_SPHERE: -4.75

## 2018-06-15 NOTE — PROGRESS NOTES
Assessment/Plan:   Follow up with Dr Eduarda Anand for abd pain   GC/CT done  Normal wet mount  Continue daily ASMITA use as directed  Will consider follow up with Dr Monty Huerta if normal visit with Dr Avani Valladares and all orders for this visit:    Pap smear for cervical cancer screening  -     Liquid-based pap, screening    Routine screening for STI (sexually transmitted infection)  -     POCT wet mount  -     Chlamydia/GC amplified DNA by PCR    Pelvic pain  -     Ambulatory referral to Gastroenterology; Future    Other orders  -     clobetasol (TEMOVATE) 0 05 % cream;   -     ergocalciferol (VITAMIN D2) 50,000 units;   -     CHANTIX STARTING MONTH MC 0 5 MG X 11 & 1 MG X 42 tablet;               Subjective:      Patient ID: Taylor Lee is a 25 y o  female  Taylor Lee is a 25 y o  female who is here today for her follow up to have a pap and cultures  She was bleeding heavily at the time her her annual gynecologic visit so they were unable to be done at that time  Normal recently pelvic US  Will obtain results of breast US done on 5/30/18  Monthly menses x 30 days with light flow  Menses is not acceptable  Taylor Lee is not sexually active x 1 year  Take ASMITA daily and is compliant  C/o intermittent abd/pelvic pain  Rates pain 6-10/10  Worse during menses; less when menses has resolved  Occur 4 times per month + with menses  Will last mintues and spontaneously resolves except will last all day one of her menses  No pain medication taken because it's ineffective  Follows up with PT for her 12 year back pain  The following portions of the patient's history were reviewed and updated as appropriate: allergies, current medications, past family history, past medical history, past social history, past surgical history and problem list     Review of Systems   Constitutional: Negative  Negative for activity change, appetite change, chills, diaphoresis, fatigue, fever and unexpected weight change  Gastrointestinal: Positive for abdominal pain  Negative for constipation, diarrhea, nausea and vomiting  Genitourinary: Positive for menstrual problem and pelvic pain  Negative for difficulty urinating, dyspareunia, dysuria, frequency, hematuria, urgency, vaginal bleeding, vaginal discharge and vaginal pain  Musculoskeletal: Positive for back pain  Skin: Negative  Allergic/Immunologic: Negative  Neurological: Negative for weakness and headaches  Hematological: Negative for adenopathy  Psychiatric/Behavioral: Negative  Objective:      /60 (BP Location: Right arm, Patient Position: Sitting)   Ht 5' 3" (1 6 m)   Wt 79 7 kg (175 lb 12 8 oz)   LMP 05/10/2018   BMI 31 14 kg/m²          Physical Exam   Constitutional: She is oriented to person, place, and time  She appears well-developed and well-nourished  Eyes: Right eye exhibits no discharge  Left eye exhibits no discharge  Neck: Neck supple  Abdominal: Soft  Genitourinary: Vagina normal and uterus normal  Rectal exam shows no external hemorrhoid  No labial fusion  There is no rash, tenderness, lesion or injury on the right labia  There is no rash, tenderness, lesion or injury on the left labia  Cervix exhibits no motion tenderness, no discharge and no friability  Right adnexum displays no mass, no tenderness and no fullness  Left adnexum displays no mass, no tenderness and no fullness  No erythema, tenderness or bleeding in the vagina  No foreign body in the vagina  No signs of injury around the vagina  No vaginal discharge found  Musculoskeletal: Normal range of motion  Lymphadenopathy:     She has no cervical adenopathy  Right: No inguinal adenopathy present  Left: No inguinal adenopathy present  Neurological: She is alert and oriented to person, place, and time  Skin: Skin is warm and dry  Psychiatric: She has a normal mood and affect  Nursing note and vitals reviewed

## 2018-06-18 ENCOUNTER — OFFICE VISIT (OUTPATIENT)
Dept: GYNECOLOGY | Facility: CLINIC | Age: 28
End: 2018-06-18
Payer: COMMERCIAL

## 2018-06-18 VITALS
SYSTOLIC BLOOD PRESSURE: 102 MMHG | HEIGHT: 63 IN | WEIGHT: 175.8 LBS | DIASTOLIC BLOOD PRESSURE: 60 MMHG | BODY MASS INDEX: 31.15 KG/M2

## 2018-06-18 DIAGNOSIS — R10.2 PELVIC PAIN: ICD-10-CM

## 2018-06-18 DIAGNOSIS — Z12.4 PAP SMEAR FOR CERVICAL CANCER SCREENING: Primary | ICD-10-CM

## 2018-06-18 DIAGNOSIS — Z11.3 ROUTINE SCREENING FOR STI (SEXUALLY TRANSMITTED INFECTION): ICD-10-CM

## 2018-06-18 PROCEDURE — G0145 SCR C/V CYTO,THINLAYER,RESCR: HCPCS | Performed by: NURSE PRACTITIONER

## 2018-06-18 PROCEDURE — 87624 HPV HI-RISK TYP POOLED RSLT: CPT | Performed by: NURSE PRACTITIONER

## 2018-06-18 PROCEDURE — 87591 N.GONORRHOEAE DNA AMP PROB: CPT | Performed by: NURSE PRACTITIONER

## 2018-06-18 PROCEDURE — 99213 OFFICE O/P EST LOW 20 MIN: CPT | Performed by: NURSE PRACTITIONER

## 2018-06-18 PROCEDURE — 87210 SMEAR WET MOUNT SALINE/INK: CPT | Performed by: NURSE PRACTITIONER

## 2018-06-18 PROCEDURE — 87491 CHLMYD TRACH DNA AMP PROBE: CPT | Performed by: NURSE PRACTITIONER

## 2018-06-18 RX ORDER — ERGOCALCIFEROL 1.25 MG/1
CAPSULE ORAL
COMMUNITY
Start: 2018-05-07 | End: 2018-12-31 | Stop reason: ALTCHOICE

## 2018-06-18 RX ORDER — VARENICLINE TARTRATE
KIT
COMMUNITY
Start: 2018-05-07 | End: 2018-12-31 | Stop reason: ALTCHOICE

## 2018-06-18 RX ORDER — CLOBETASOL PROPIONATE 0.5 MG/G
CREAM TOPICAL
COMMUNITY
Start: 2018-05-07 | End: 2018-12-31 | Stop reason: ALTCHOICE

## 2018-06-18 NOTE — PATIENT INSTRUCTIONS
Follow up with Dr Shady Gold for abd pain   GC/CT done  Normal wet mount  Continue daily ASMITA use as directed  Will consider follow up with Dr Caio Swartz if normal visit with Dr Shady Gold

## 2018-06-20 LAB
CHLAMYDIA DNA CVX QL NAA+PROBE: NORMAL
N GONORRHOEA DNA GENITAL QL NAA+PROBE: NORMAL

## 2018-06-22 LAB — HPV RRNA GENITAL QL NAA+PROBE: NORMAL

## 2018-06-26 LAB
LAB AP GYN PRIMARY INTERPRETATION: NORMAL
Lab: NORMAL

## 2018-07-03 ENCOUNTER — TELEPHONE (OUTPATIENT)
Dept: GYNECOLOGY | Facility: CLINIC | Age: 28
End: 2018-07-03

## 2018-07-03 DIAGNOSIS — Z30.41 ENCOUNTER FOR SURVEILLANCE OF CONTRACEPTIVE PILLS: ICD-10-CM

## 2018-07-23 ENCOUNTER — TELEPHONE (OUTPATIENT)
Dept: GYNECOLOGY | Facility: CLINIC | Age: 28
End: 2018-07-23

## 2018-07-23 NOTE — TELEPHONE ENCOUNTER
Requesting we fix the directions on her birth control  States because she skips her placebo pills she finishes her pack early and the pharmacy will not fill her next pack until the 27th, and she only has 1 pill left

## 2018-07-24 NOTE — TELEPHONE ENCOUNTER
I tried calling her phone number but sounds like it may be an incorrect phone number  I sent a rx to her pharmacy on 7/3/18 the way she requested  Please also contact the pharmacy to see what the problem is  I can see the rx in her chart

## 2018-07-31 LAB
T4 FREE SERPL-MCNC: 1.3 NG/DL (ref 0.8–1.8)
TSH SERPL-ACNC: 1.22 MIU/L

## 2018-08-01 ENCOUNTER — OPTICAL OFFICE (OUTPATIENT)
Dept: URBAN - METROPOLITAN AREA CLINIC 146 | Facility: CLINIC | Age: 28
Setting detail: OPHTHALMOLOGY
End: 2018-08-01

## 2018-08-01 DIAGNOSIS — H52.13: ICD-10-CM

## 2018-08-01 PROCEDURE — S0500 DISPOS CONT LENS: HCPCS | Performed by: OPHTHALMOLOGY

## 2018-10-16 NOTE — PROGRESS NOTES
Assessment/Plan:   Neg UPT today  Bacterial vaginosis noted on wet mount  Rx sent to requested pharmacy  No sex during treatment  Complete all medication as directed  Call with any recurrence of symptoms  Diagnoses and all orders for this visit:    BV (bacterial vaginosis)  -     metroNIDAZOLE (FLAGYL) 500 mg tablet; Take 1 tablet (500 mg total) by mouth every 12 (twelve) hours for 7 days No ETOH x 8 days   -     POCT wet mount    Breast tenderness in female  -     POCT urine HCG    Routine screening for STI (sexually transmitted infection)  -     Chlamydia/GC amplified DNA by PCR    Other orders  -     valACYclovir (VALTREX) 500 mg tablet; Take 500 mg by mouth daily  -     Discontinue: norethindrone-ethinyl estradiol (BALZIVA) 0 4-35 MG-MCG per tablet; Take by mouth              Subjective:      Patient ID: Naomi Ivy is a 25 y o  female  Naomi Ivy is a 25 y o  female who is here today for a problem visit  Admits to a milky white to thick discharge that cause external itching x2-3 weeks  No menses x 7 months  Taking ASMITA daily and is extended cycling  Hx of tubal ligation but requested a pregnancy test today  Naomi Ivy is sexually active with male partner of 4 years  Started having sex with her "kids father" when he came home after 4 years in June  They are "working things out " She made him aware of HSV and continues to take daily valtrex  Plans to start chantix next week  Admits to intermittent breast tenderness  No palpable lumps or nipple discharge  The following portions of the patient's history were reviewed and updated as appropriate: allergies, current medications, past family history, past medical history, past social history, past surgical history and problem list     Review of Systems   Constitutional: Negative  HENT: Negative for sore throat and trouble swallowing  Gastrointestinal: Negative for abdominal pain, constipation, diarrhea, nausea and vomiting  Genitourinary: Negative for dyspareunia, dysuria, genital sores, menstrual problem, pelvic pain, vaginal bleeding, vaginal discharge and vaginal pain  Musculoskeletal: Negative for arthralgias and myalgias  Skin: Negative  Hematological: Negative for adenopathy  Psychiatric/Behavioral: Negative  All other systems reviewed and are negative  Objective:      /64 (BP Location: Left arm, Patient Position: Sitting)   Pulse 76   Ht 5' 3" (1 6 m)   Wt 79 7 kg (175 lb 9 6 oz)   LMP  (LMP Unknown)   BMI 31 11 kg/m²          Physical Exam   Constitutional: She is oriented to person, place, and time  She appears well-developed and well-nourished  Genitourinary: Uterus normal  Rectal exam shows no external hemorrhoid  No labial fusion  There is no rash, tenderness, lesion or injury on the right labia  There is no rash, tenderness, lesion or injury on the left labia  Cervix exhibits discharge  Cervix exhibits no motion tenderness and no friability  Right adnexum displays no mass, no tenderness and no fullness  Left adnexum displays no mass, no tenderness and no fullness  No erythema, tenderness or bleeding in the vagina  No foreign body in the vagina  No signs of injury around the vagina  Vaginal discharge found  Lymphadenopathy:        Right: No inguinal adenopathy present  Left: No inguinal adenopathy present  Neurological: She is alert and oriented to person, place, and time  Skin: Skin is warm and dry  Psychiatric: She has a normal mood and affect  Nursing note and vitals reviewed

## 2018-10-18 ENCOUNTER — OFFICE VISIT (OUTPATIENT)
Dept: GYNECOLOGY | Facility: CLINIC | Age: 28
End: 2018-10-18
Payer: COMMERCIAL

## 2018-10-18 VITALS
DIASTOLIC BLOOD PRESSURE: 64 MMHG | HEART RATE: 76 BPM | HEIGHT: 63 IN | BODY MASS INDEX: 31.11 KG/M2 | SYSTOLIC BLOOD PRESSURE: 118 MMHG | WEIGHT: 175.6 LBS

## 2018-10-18 DIAGNOSIS — B96.89 BV (BACTERIAL VAGINOSIS): Primary | ICD-10-CM

## 2018-10-18 DIAGNOSIS — N64.4 BREAST TENDERNESS IN FEMALE: ICD-10-CM

## 2018-10-18 DIAGNOSIS — N76.0 BV (BACTERIAL VAGINOSIS): Primary | ICD-10-CM

## 2018-10-18 DIAGNOSIS — Z11.3 ROUTINE SCREENING FOR STI (SEXUALLY TRANSMITTED INFECTION): ICD-10-CM

## 2018-10-18 LAB — SL AMB POCT URINE HCG: NEGATIVE

## 2018-10-18 PROCEDURE — 87591 N.GONORRHOEAE DNA AMP PROB: CPT | Performed by: NURSE PRACTITIONER

## 2018-10-18 PROCEDURE — 87491 CHLMYD TRACH DNA AMP PROBE: CPT | Performed by: NURSE PRACTITIONER

## 2018-10-18 PROCEDURE — 81025 URINE PREGNANCY TEST: CPT | Performed by: NURSE PRACTITIONER

## 2018-10-18 PROCEDURE — 87210 SMEAR WET MOUNT SALINE/INK: CPT | Performed by: NURSE PRACTITIONER

## 2018-10-18 PROCEDURE — 99213 OFFICE O/P EST LOW 20 MIN: CPT | Performed by: NURSE PRACTITIONER

## 2018-10-18 RX ORDER — VALACYCLOVIR HYDROCHLORIDE 500 MG/1
500 TABLET, FILM COATED ORAL DAILY
Refills: 8 | COMMUNITY
Start: 2018-09-22 | End: 2019-06-17

## 2018-10-18 RX ORDER — METRONIDAZOLE 500 MG/1
500 TABLET ORAL EVERY 12 HOURS SCHEDULED
Qty: 14 TABLET | Refills: 0 | Status: SHIPPED | OUTPATIENT
Start: 2018-10-18 | End: 2018-10-25

## 2018-10-18 NOTE — PATIENT INSTRUCTIONS
Neg UPT today  Bacterial vaginosis noted on wet mount  Rx sent to requested pharmacy  No sex during treatment  Complete all medication as directed  Call with any recurrence of symptoms

## 2018-10-22 LAB
CHLAMYDIA DNA CVX QL NAA+PROBE: NORMAL
N GONORRHOEA DNA GENITAL QL NAA+PROBE: NORMAL

## 2018-11-23 ENCOUNTER — OFFICE VISIT (OUTPATIENT)
Dept: GYNECOLOGY | Facility: CLINIC | Age: 28
End: 2018-11-23
Payer: COMMERCIAL

## 2018-11-23 VITALS
RESPIRATION RATE: 12 BRPM | DIASTOLIC BLOOD PRESSURE: 68 MMHG | BODY MASS INDEX: 31.22 KG/M2 | SYSTOLIC BLOOD PRESSURE: 112 MMHG | WEIGHT: 176.2 LBS | HEART RATE: 80 BPM | HEIGHT: 63 IN

## 2018-11-23 DIAGNOSIS — B96.89 BV (BACTERIAL VAGINOSIS): Primary | ICD-10-CM

## 2018-11-23 DIAGNOSIS — N76.0 BV (BACTERIAL VAGINOSIS): Primary | ICD-10-CM

## 2018-11-23 DIAGNOSIS — Z11.3 ROUTINE SCREENING FOR STI (SEXUALLY TRANSMITTED INFECTION): ICD-10-CM

## 2018-11-23 DIAGNOSIS — Z30.41 ENCOUNTER FOR SURVEILLANCE OF CONTRACEPTIVE PILLS: ICD-10-CM

## 2018-11-23 PROCEDURE — 99213 OFFICE O/P EST LOW 20 MIN: CPT | Performed by: NURSE PRACTITIONER

## 2018-11-23 PROCEDURE — 87210 SMEAR WET MOUNT SALINE/INK: CPT | Performed by: NURSE PRACTITIONER

## 2018-11-23 RX ORDER — METRONIDAZOLE 500 MG/1
500 TABLET ORAL EVERY 12 HOURS SCHEDULED
Qty: 14 TABLET | Refills: 0 | Status: SHIPPED | OUTPATIENT
Start: 2018-11-23 | End: 2018-11-30

## 2018-11-23 RX ORDER — CLOTRIMAZOLE AND BETAMETHASONE DIPROPIONATE 10; .64 MG/G; MG/G
CREAM TOPICAL
Refills: 0 | COMMUNITY
Start: 2018-10-23 | End: 2020-07-13 | Stop reason: ALTCHOICE

## 2018-11-23 RX ORDER — ESCITALOPRAM OXALATE 10 MG/1
TABLET ORAL
Refills: 1 | COMMUNITY
Start: 2018-10-23 | End: 2019-06-17 | Stop reason: ALTCHOICE

## 2018-11-23 RX ORDER — BORIC ACID
POWDER (GRAM) MISCELLANEOUS
Qty: 21 BOTTLE | Refills: 0 | Status: SHIPPED | OUTPATIENT
Start: 2018-11-23 | End: 2019-01-28 | Stop reason: ALTCHOICE

## 2018-11-23 NOTE — PATIENT INSTRUCTIONS
Bacterial vaginosis noted on wet mount  Rx sent to requested pharmacy  No sex during treatment  Complete all medication as directed  Call with any recurrence of symptoms     Probiotic daily

## 2018-11-23 NOTE — PROGRESS NOTES
Assessment/Plan:     Bacterial vaginosis noted on wet mount  Rx sent to requested pharmacy  No sex during treatment  Complete all medication as directed  Call with any recurrence of symptoms  Probiotic daily      Diagnoses and all orders for this visit:    BV (bacterial vaginosis)  -     metroNIDAZOLE (FLAGYL) 500 mg tablet; Take 1 tablet (500 mg total) by mouth every 12 (twelve) hours for 7 days No ETOH x 8 days  Start on day 8 of boric acid suppositories  -     boric acid; Boric acid compounded in a size 0 gel capsule intravaginally x 21 nights    Routine screening for STI (sexually transmitted infection)  -     RPR; Future  -     HIV 1/2 AG-AB combo; Future  -     Hepatitis C antibody; Future  -     POCT wet mount    Encounter for surveillance of contraceptive pills  -     norethindrone-ethinyl estradiol (BALZIVA) 0 4-35 MG-MCG per tablet; TAKE 1 TABLET BY MOUTH EVERY DAY AVOID PLACEBO PILL FOR 2 PACKS, THEN TAKE DAILY INCLUDING PLACEBOS    Other orders  -     escitalopram (LEXAPRO) 10 mg tablet; TAKE 1/2 TABLET IN THE MORNING FOR 7 DAYS, THEN INCREASE TO 1 TABLET DAILY  -     clotrimazole-betamethasone (LOTRISONE) 1-0 05 % cream; APPLY TO AFFECTED AREA TWICE A DAY IN THE MORNING AND IN THE EVENING FOR 2 WEEKS              Subjective:      Patient ID: Michael George is a 25 y o  female  Michael George is a 25 y o  female who is here today for a problem visit  She was treated for BV around 10/18  Asymptomatic x "a few days" and now c/o fishy vaginal odor with intermittent, white vaginal discharge  Also c/o external itching since last treament  Unsure if she has had any outbreaks recently  "I'm still confused about what an outbreak is because it can vary " LMP 8 months ago  Needs refill for balziva  Michael George is sexually active with male partner of 15 years on and off           The following portions of the patient's history were reviewed and updated as appropriate: allergies, current medications, past family history, past medical history, past social history, past surgical history and problem list     Review of Systems   Constitutional: Negative  HENT: Negative for sore throat and trouble swallowing  Gastrointestinal: Negative for abdominal pain, constipation, diarrhea, nausea and vomiting  Genitourinary: Positive for vaginal discharge  Negative for dyspareunia, dysuria, genital sores, menstrual problem, pelvic pain, vaginal bleeding and vaginal pain  Musculoskeletal: Negative for arthralgias and myalgias  Skin: Negative  Hematological: Negative for adenopathy  Psychiatric/Behavioral: Negative  All other systems reviewed and are negative  Objective:      /68 (BP Location: Right arm, Patient Position: Sitting, Cuff Size: Standard)   Pulse 80   Resp 12   Ht 5' 3" (1 6 m)   Wt 79 9 kg (176 lb 3 2 oz)   BMI 31 21 kg/m²          Physical Exam   Constitutional: She is oriented to person, place, and time  She appears well-developed and well-nourished  Genitourinary: Uterus normal  Rectal exam shows no external hemorrhoid  No labial fusion  There is no rash, tenderness, lesion or injury on the right labia  There is no rash, tenderness, lesion or injury on the left labia  Cervix exhibits discharge  Cervix exhibits no motion tenderness and no friability  Right adnexum displays no mass, no tenderness and no fullness  Left adnexum displays no mass, no tenderness and no fullness  No erythema, tenderness or bleeding in the vagina  No foreign body in the vagina  No signs of injury around the vagina  Vaginal discharge found  Lymphadenopathy:        Right: No inguinal adenopathy present  Left: No inguinal adenopathy present  Neurological: She is alert and oriented to person, place, and time  Skin: Skin is warm and dry  Psychiatric: She has a normal mood and affect  Nursing note and vitals reviewed

## 2018-12-31 ENCOUNTER — OFFICE VISIT (OUTPATIENT)
Dept: GYNECOLOGY | Facility: CLINIC | Age: 28
End: 2018-12-31
Payer: COMMERCIAL

## 2018-12-31 VITALS
WEIGHT: 181 LBS | HEART RATE: 66 BPM | HEIGHT: 63 IN | BODY MASS INDEX: 32.07 KG/M2 | SYSTOLIC BLOOD PRESSURE: 118 MMHG | DIASTOLIC BLOOD PRESSURE: 64 MMHG

## 2018-12-31 DIAGNOSIS — N76.0 BV (BACTERIAL VAGINOSIS): Primary | ICD-10-CM

## 2018-12-31 DIAGNOSIS — B96.89 BV (BACTERIAL VAGINOSIS): Primary | ICD-10-CM

## 2018-12-31 PROCEDURE — 99213 OFFICE O/P EST LOW 20 MIN: CPT | Performed by: NURSE PRACTITIONER

## 2018-12-31 PROCEDURE — 87210 SMEAR WET MOUNT SALINE/INK: CPT | Performed by: NURSE PRACTITIONER

## 2018-12-31 RX ORDER — CELECOXIB 200 MG/1
CAPSULE ORAL
COMMUNITY
Start: 2018-12-17 | End: 2018-12-31 | Stop reason: ALTCHOICE

## 2018-12-31 RX ORDER — METRONIDAZOLE 7.5 MG/G
1 GEL VAGINAL
Qty: 5 G | Refills: 0 | Status: SHIPPED | OUTPATIENT
Start: 2018-12-31 | End: 2019-01-05

## 2018-12-31 NOTE — PATIENT INSTRUCTIONS
Exercise 150 minutes per week  Smoking cessation  Discussed healthy diet  Probiotic daily  Bacterial vaginosis noted on wet mount  Rx sent to requested pharmacy  No sex during treatment  Complete all medication as directed  Call with any recurrence of symptoms

## 2018-12-31 NOTE — PROGRESS NOTES
Assessment/Plan:   Exercise 150 minutes per week  Smoking cessation  Discussed healthy diet  Probiotic daily  Bacterial vaginosis noted on wet mount  Rx sent to requested pharmacy  No sex during treatment  Complete all medication as directed  Call with any recurrence of symptoms  Diagnoses and all orders for this visit:    BV (bacterial vaginosis)  -     POCT wet mount  -     metroNIDAZOLE (METROGEL) 0 75 % vaginal gel; Insert 1 application into the vagina daily at bedtime for 5 days    Other orders  -     Discontinue: celecoxib (CeleBREX) 200 mg capsule;               Subjective:      Patient ID: Marcie Lester is a 25 y o  female  Marcie Lester is a 25 y o  female who is here today for a problem visit  States that she completed treatment for BV with flagyl and boric acid  Had normal vaginal discharge until she had sex with her long term partner  She believes he uses dove bar soap to bathe but also drinks a significant amount of alcohol  She does not use a condom  She fears he could be cheating but hasn't confronted him  States she loves him  LMP 9 months ago  Taking ASMITA daily  Never started probiotic  Quit smoking x 4 months and now smoking 4 cigs per day  The following portions of the patient's history were reviewed and updated as appropriate: allergies, current medications, past family history, past medical history, past social history, past surgical history and problem list     Review of Systems      Objective:      /64 (BP Location: Right arm, Patient Position: Sitting)   Pulse 66   Ht 5' 3" (1 6 m)   Wt 82 1 kg (181 lb)   LMP  (LMP Unknown)   BMI 32 06 kg/m²          Physical Exam   Constitutional: She is oriented to person, place, and time  She appears well-developed and well-nourished  Genitourinary: Uterus normal  Rectal exam shows no external hemorrhoid  No labial fusion  There is no rash, tenderness, lesion or injury on the right labia   There is no rash, tenderness, lesion or injury on the left labia  Cervix exhibits discharge  Cervix exhibits no motion tenderness and no friability  Right adnexum displays no mass, no tenderness and no fullness  Left adnexum displays no mass, no tenderness and no fullness  No erythema, tenderness or bleeding in the vagina  No foreign body in the vagina  No signs of injury around the vagina  Vaginal discharge (homogenous thin white malodorous discharge) found  Lymphadenopathy:        Right: No inguinal adenopathy present  Left: No inguinal adenopathy present  Neurological: She is alert and oriented to person, place, and time  Skin: Skin is warm and dry  Psychiatric: She has a normal mood and affect  Nursing note and vitals reviewed

## 2019-01-26 NOTE — PROGRESS NOTES
Assessment/Plan:     Bacterial vaginosis noted on wet mount  Rx sent to requested pharmacy  No sex during treatment  Complete all medication as directed  Call with any recurrence of symptoms  Smoking cessation encouraged  Daily probiotic  GC/CT cultures done      Diagnoses and all orders for this visit:    BV (bacterial vaginosis)  -     metroNIDAZOLE (FLAGYL) 500 mg tablet; Take 1 tablet (500 mg total) by mouth every 12 (twelve) hours for 7 days No ETOH x 8 days  Cigarette nicotine dependence without complication  -     Ambulatory referral to Smoking Cessation Program; Future    Routine screening for STI (sexually transmitted infection)  -     Chlamydia/GC amplified DNA by PCR              Subjective:      Patient ID: Pam Hunter is a 25 y o  female  Pam Hunter is a 25 y o  female who is here today for a problem visit  Here to verify that her BV has gone away  Completed flagyl for BV around 12/31/18  Slight odor now but "less potent " Possibly normal discharge  Questionable HSV outbreak currently-no lesions just "feels funny down there " Smoking 1-2 cigs per day  Not taking a probiotic due to cost  Will have money for them in 2 weeks  No menses x 1 year  Taking [place] daily  Pam Hunter is sexually active with male partner  She would like cultures for GC/CT as she questions her partners faithfulness  Neg GC/CT 10/18  She has not followed with Dr Remy Monzon yet  Continues with lower abd cramping  The following portions of the patient's history were reviewed and updated as appropriate: allergies, current medications, past family history, past medical history, past social history, past surgical history and problem list     Review of Systems   Constitutional: Negative  Gastrointestinal: Negative for abdominal pain, constipation, diarrhea, nausea and vomiting     Genitourinary: Negative for decreased urine volume, dyspareunia, dysuria, genital sores, menstrual problem, pelvic pain, urgency, vaginal bleeding, vaginal discharge and vaginal pain  Slight vaginal odor   Musculoskeletal: Negative for arthralgias and myalgias  Skin: Negative  Hematological: Negative for adenopathy  Psychiatric/Behavioral: Negative  All other systems reviewed and are negative  Objective:      /76 (BP Location: Right arm, Patient Position: Sitting)   Pulse 76   Ht 5' 3" (1 6 m)   Wt 81 1 kg (178 lb 12 8 oz)   LMP  (LMP Unknown)   BMI 31 67 kg/m²          Physical Exam   Constitutional: She is oriented to person, place, and time  She appears well-developed and well-nourished  Genitourinary: Uterus normal  Rectal exam shows no external hemorrhoid  No labial fusion  There is no rash, tenderness, lesion or injury on the right labia  There is no rash, tenderness, lesion or injury on the left labia  Cervix exhibits discharge  Cervix exhibits no motion tenderness and no friability  Right adnexum displays no mass, no tenderness and no fullness  Left adnexum displays no mass, no tenderness and no fullness  No erythema, tenderness or bleeding in the vagina  No foreign body in the vagina  No signs of injury around the vagina  Vaginal discharge (scant white discharge) found  Lymphadenopathy:        Right: No inguinal adenopathy present  Left: No inguinal adenopathy present  Neurological: She is alert and oriented to person, place, and time  Skin: Skin is warm and dry  Psychiatric: She has a normal mood and affect  Nursing note and vitals reviewed

## 2019-01-28 ENCOUNTER — OFFICE VISIT (OUTPATIENT)
Dept: GYNECOLOGY | Facility: CLINIC | Age: 29
End: 2019-01-28
Payer: COMMERCIAL

## 2019-01-28 VITALS
SYSTOLIC BLOOD PRESSURE: 102 MMHG | BODY MASS INDEX: 31.68 KG/M2 | HEIGHT: 63 IN | WEIGHT: 178.8 LBS | DIASTOLIC BLOOD PRESSURE: 76 MMHG | HEART RATE: 76 BPM

## 2019-01-28 DIAGNOSIS — B96.89 BV (BACTERIAL VAGINOSIS): Primary | ICD-10-CM

## 2019-01-28 DIAGNOSIS — N76.0 BV (BACTERIAL VAGINOSIS): Primary | ICD-10-CM

## 2019-01-28 DIAGNOSIS — Z11.3 ROUTINE SCREENING FOR STI (SEXUALLY TRANSMITTED INFECTION): ICD-10-CM

## 2019-01-28 DIAGNOSIS — F17.210 CIGARETTE NICOTINE DEPENDENCE WITHOUT COMPLICATION: ICD-10-CM

## 2019-01-28 PROCEDURE — 87591 N.GONORRHOEAE DNA AMP PROB: CPT | Performed by: NURSE PRACTITIONER

## 2019-01-28 PROCEDURE — 87210 SMEAR WET MOUNT SALINE/INK: CPT | Performed by: NURSE PRACTITIONER

## 2019-01-28 PROCEDURE — 99214 OFFICE O/P EST MOD 30 MIN: CPT | Performed by: NURSE PRACTITIONER

## 2019-01-28 PROCEDURE — 87491 CHLMYD TRACH DNA AMP PROBE: CPT | Performed by: NURSE PRACTITIONER

## 2019-01-28 RX ORDER — METRONIDAZOLE 500 MG/1
500 TABLET ORAL EVERY 12 HOURS SCHEDULED
Qty: 14 TABLET | Refills: 0 | Status: SHIPPED | OUTPATIENT
Start: 2019-01-28 | End: 2019-02-04

## 2019-01-28 NOTE — PATIENT INSTRUCTIONS
Bacterial vaginosis noted on wet mount  Rx sent to requested pharmacy  No sex during treatment  Complete all medication as directed  Call with any recurrence of symptoms     Smoking cessation encouraged  Daily probiotic  GC/CT cultures done

## 2019-01-31 LAB
C TRACH DNA SPEC QL NAA+PROBE: NEGATIVE
HCV AB S/CO SERPL IA: 0.04
HCV AB SERPL QL IA: NORMAL
HIV 1+2 AB+HIV1 P24 AG SERPL QL IA: NORMAL
N GONORRHOEA DNA SPEC QL NAA+PROBE: NEGATIVE
RPR SER QL: NORMAL

## 2019-02-01 ENCOUNTER — TELEPHONE (OUTPATIENT)
Dept: GYNECOLOGY | Facility: CLINIC | Age: 29
End: 2019-02-01

## 2019-02-01 NOTE — TELEPHONE ENCOUNTER
I called patient to make appointment for test results and she would like to speak with you, states she can not make it here and would like the results over the phone

## 2019-02-15 DIAGNOSIS — Z30.41 ENCOUNTER FOR SURVEILLANCE OF CONTRACEPTIVE PILLS: ICD-10-CM

## 2019-02-26 ENCOUNTER — TELEPHONE (OUTPATIENT)
Dept: GYNECOLOGY | Facility: CLINIC | Age: 29
End: 2019-02-26

## 2019-02-26 DIAGNOSIS — B96.89 BV (BACTERIAL VAGINOSIS): Primary | ICD-10-CM

## 2019-02-26 DIAGNOSIS — N76.0 BV (BACTERIAL VAGINOSIS): Primary | ICD-10-CM

## 2019-02-26 DIAGNOSIS — Z30.41 ENCOUNTER FOR SURVEILLANCE OF CONTRACEPTIVE PILLS: ICD-10-CM

## 2019-02-26 RX ORDER — METRONIDAZOLE 500 MG/1
500 TABLET ORAL EVERY 12 HOURS SCHEDULED
Qty: 14 TABLET | Refills: 0 | Status: SHIPPED | OUTPATIENT
Start: 2019-02-26 | End: 2019-03-05

## 2019-02-26 NOTE — TELEPHONE ENCOUNTER
Patient states that she tried to  her [facility] but they stated she could not get it until the 5th, she is already three days without it  She would also like to know if you could call in another RX for Flagyl?

## 2019-05-24 ENCOUNTER — TELEPHONE (OUTPATIENT)
Dept: GYNECOLOGY | Facility: CLINIC | Age: 29
End: 2019-05-24

## 2019-05-24 DIAGNOSIS — B96.89 BV (BACTERIAL VAGINOSIS): Primary | ICD-10-CM

## 2019-05-24 DIAGNOSIS — N76.0 BV (BACTERIAL VAGINOSIS): Primary | ICD-10-CM

## 2019-05-24 RX ORDER — METRONIDAZOLE 500 MG/1
500 TABLET ORAL EVERY 12 HOURS SCHEDULED
Qty: 14 TABLET | Refills: 0 | Status: SHIPPED | OUTPATIENT
Start: 2019-05-24 | End: 2019-05-31

## 2019-06-14 PROBLEM — Z01.419 ENCNTR FOR GYN EXAM (GENERAL) (ROUTINE) W/O ABN FINDINGS: Status: ACTIVE | Noted: 2019-06-14

## 2019-06-17 ENCOUNTER — ANNUAL EXAM (OUTPATIENT)
Dept: GYNECOLOGY | Facility: CLINIC | Age: 29
End: 2019-06-17
Payer: COMMERCIAL

## 2019-06-17 VITALS
HEIGHT: 63 IN | HEART RATE: 62 BPM | SYSTOLIC BLOOD PRESSURE: 120 MMHG | DIASTOLIC BLOOD PRESSURE: 78 MMHG | BODY MASS INDEX: 32.32 KG/M2 | WEIGHT: 182.4 LBS

## 2019-06-17 DIAGNOSIS — N76.0 BV (BACTERIAL VAGINOSIS): ICD-10-CM

## 2019-06-17 DIAGNOSIS — B96.89 BV (BACTERIAL VAGINOSIS): ICD-10-CM

## 2019-06-17 DIAGNOSIS — Z30.41 ENCOUNTER FOR SURVEILLANCE OF CONTRACEPTIVE PILLS: ICD-10-CM

## 2019-06-17 DIAGNOSIS — F17.210 CIGARETTE NICOTINE DEPENDENCE WITHOUT COMPLICATION: ICD-10-CM

## 2019-06-17 DIAGNOSIS — B00.9 HSV INFECTION: ICD-10-CM

## 2019-06-17 DIAGNOSIS — N63.0 BREAST NODULE: ICD-10-CM

## 2019-06-17 DIAGNOSIS — Z01.411 ENCNTR FOR GYN EXAM (GENERAL) (ROUTINE) W ABNORMAL FINDINGS: Primary | ICD-10-CM

## 2019-06-17 PROBLEM — Z86.19 HISTORY OF HERPES GENITALIS: Status: ACTIVE | Noted: 2019-06-17

## 2019-06-17 PROCEDURE — 99395 PREV VISIT EST AGE 18-39: CPT | Performed by: NURSE PRACTITIONER

## 2019-06-17 PROCEDURE — 87210 SMEAR WET MOUNT SALINE/INK: CPT | Performed by: NURSE PRACTITIONER

## 2019-06-17 RX ORDER — TERBINAFINE HYDROCHLORIDE 250 MG/1
250 TABLET ORAL DAILY
Refills: 0 | COMMUNITY
Start: 2019-03-19 | End: 2020-07-13 | Stop reason: ALTCHOICE

## 2019-06-17 RX ORDER — VARENICLINE TARTRATE 1 MG/1
1 TABLET, FILM COATED ORAL 2 TIMES DAILY
Refills: 5 | COMMUNITY
Start: 2019-04-05 | End: 2020-07-13 | Stop reason: ALTCHOICE

## 2019-06-17 RX ORDER — ERGOCALCIFEROL 1.25 MG/1
50000 CAPSULE ORAL WEEKLY
Refills: 0 | COMMUNITY
Start: 2019-05-07 | End: 2021-05-27 | Stop reason: SDUPTHER

## 2019-06-17 RX ORDER — VALACYCLOVIR HYDROCHLORIDE 500 MG/1
500 TABLET, FILM COATED ORAL DAILY
Qty: 90 TABLET | Refills: 3 | Status: SHIPPED | OUTPATIENT
Start: 2019-06-17 | End: 2020-07-13 | Stop reason: ALTCHOICE

## 2019-06-17 RX ORDER — VALACYCLOVIR HYDROCHLORIDE 500 MG/1
500 TABLET, FILM COATED ORAL DAILY
Qty: 90 TABLET | Refills: 3 | Status: CANCELLED | OUTPATIENT
Start: 2019-06-17 | End: 2019-09-15

## 2019-06-17 RX ORDER — BORIC ACID
POWDER (GRAM) MISCELLANEOUS
Qty: 1 BOTTLE | Refills: 3 | Status: SHIPPED | OUTPATIENT
Start: 2019-06-17 | End: 2020-01-30 | Stop reason: SDUPTHER

## 2019-08-29 ENCOUNTER — TELEPHONE (OUTPATIENT)
Dept: GYNECOLOGY | Facility: CLINIC | Age: 29
End: 2019-08-29

## 2019-08-29 DIAGNOSIS — N76.0 BV (BACTERIAL VAGINOSIS): Primary | ICD-10-CM

## 2019-08-29 DIAGNOSIS — B96.89 BV (BACTERIAL VAGINOSIS): Primary | ICD-10-CM

## 2019-08-29 RX ORDER — METRONIDAZOLE 500 MG/1
500 TABLET ORAL EVERY 12 HOURS SCHEDULED
Qty: 14 TABLET | Refills: 0 | Status: SHIPPED | OUTPATIENT
Start: 2019-08-29 | End: 2019-09-05

## 2019-10-24 ENCOUNTER — TELEPHONE (OUTPATIENT)
Dept: PHYSICAL THERAPY | Facility: OTHER | Age: 29
End: 2019-10-24

## 2019-10-24 ENCOUNTER — HOSPITAL ENCOUNTER (EMERGENCY)
Facility: HOSPITAL | Age: 29
Discharge: HOME | End: 2019-10-24
Attending: EMERGENCY MEDICINE | Admitting: EMERGENCY MEDICINE
Payer: COMMERCIAL

## 2019-10-24 VITALS
SYSTOLIC BLOOD PRESSURE: 119 MMHG | TEMPERATURE: 97.9 F | DIASTOLIC BLOOD PRESSURE: 72 MMHG | WEIGHT: 179.98 LBS | OXYGEN SATURATION: 100 % | BODY MASS INDEX: 31.88 KG/M2 | HEART RATE: 75 BPM | RESPIRATION RATE: 16 BRPM

## 2019-10-24 DIAGNOSIS — S16.1XXA ACUTE STRAIN OF NECK MUSCLE, INITIAL ENCOUNTER: Primary | ICD-10-CM

## 2019-10-24 DIAGNOSIS — M54.9 BACK PAIN: ICD-10-CM

## 2019-10-24 PROCEDURE — 99284 EMERGENCY DEPT VISIT MOD MDM: CPT | Performed by: EMERGENCY MEDICINE

## 2019-10-24 PROCEDURE — 99283 EMERGENCY DEPT VISIT LOW MDM: CPT

## 2019-10-24 RX ORDER — CYCLOBENZAPRINE HCL 10 MG
10 TABLET ORAL ONCE
Status: COMPLETED | OUTPATIENT
Start: 2019-10-24 | End: 2019-10-24

## 2019-10-24 RX ORDER — NAPROXEN 375 MG/1
375 TABLET ORAL 2 TIMES DAILY WITH MEALS
Qty: 20 TABLET | Refills: 0 | Status: SHIPPED | OUTPATIENT
Start: 2019-10-24 | End: 2020-07-13 | Stop reason: ALTCHOICE

## 2019-10-24 RX ORDER — CYCLOBENZAPRINE HCL 10 MG
10 TABLET ORAL 2 TIMES DAILY PRN
Qty: 20 TABLET | Refills: 0 | Status: SHIPPED | OUTPATIENT
Start: 2019-10-24 | End: 2020-07-13 | Stop reason: ALTCHOICE

## 2019-10-24 RX ADMIN — CYCLOBENZAPRINE HYDROCHLORIDE 10 MG: 10 TABLET, FILM COATED ORAL at 03:15
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
## 2019-10-24 NOTE — TELEPHONE ENCOUNTER
Voice message left for patient to call back  Phone number and hours of business provided  Called X1  normal...

## 2019-10-28 ENCOUNTER — TELEPHONE (OUTPATIENT)
Dept: PHYSICAL THERAPY | Facility: OTHER | Age: 29
End: 2019-10-28

## 2019-10-28 NOTE — TELEPHONE ENCOUNTER
Voice message left for patient to call back  Phone number and hours of business provided  2nd voice message left    Referral closed

## 2019-12-12 DIAGNOSIS — Z30.41 ENCOUNTER FOR SURVEILLANCE OF CONTRACEPTIVE PILLS: ICD-10-CM

## 2020-01-09 ENCOUNTER — APPOINTMENT (OUTPATIENT)
Dept: URGENT CARE | Facility: MEDICAL CENTER | Age: 30
End: 2020-01-09
Payer: OTHER MISCELLANEOUS

## 2020-01-09 ENCOUNTER — APPOINTMENT (OUTPATIENT)
Dept: RADIOLOGY | Facility: MEDICAL CENTER | Age: 30
End: 2020-01-09
Attending: PHYSICIAN ASSISTANT
Payer: OTHER MISCELLANEOUS

## 2020-01-09 ENCOUNTER — TRANSCRIBE ORDERS (OUTPATIENT)
Dept: URGENT CARE | Facility: MEDICAL CENTER | Age: 30
End: 2020-01-09
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
## 2020-01-09 PROCEDURE — G0382 LEV 3 HOSP TYPE B ED VISIT: HCPCS

## 2020-01-09 PROCEDURE — 73030 X-RAY EXAM OF SHOULDER: CPT

## 2020-01-09 PROCEDURE — 99283 EMERGENCY DEPT VISIT LOW MDM: CPT

## 2020-01-10 ENCOUNTER — APPOINTMENT (OUTPATIENT)
Dept: URGENT CARE | Facility: MEDICAL CENTER | Age: 30
End: 2020-01-10

## 2020-01-15 ENCOUNTER — EVALUATION (OUTPATIENT)
Dept: PHYSICAL THERAPY | Facility: CLINIC | Age: 30
End: 2020-01-15
Payer: COMMERCIAL

## 2020-01-15 DIAGNOSIS — M75.81 RIGHT ROTATOR CUFF TENDONITIS: Primary | ICD-10-CM

## 2020-01-15 PROCEDURE — 97162 PT EVAL MOD COMPLEX 30 MIN: CPT | Performed by: PHYSICAL THERAPIST

## 2020-01-15 NOTE — LETTER
2020    Moriah Decker PA-C  [address] [place] [address]    Patient: Denise Owens   YOB: 1990   Date of Visit: 1/15/2020     Encounter Diagnosis     ICD-10-CM    1  Right rotator cuff tendonitis M75 81        Dear Dr Noemi Gant: Thank you for your recent referral of Denise Owens  Please review the attached evaluation summary from [place] [address] recent visit  Please verify that you agree with the plan of care by signing the attached order  If you have any questions or concerns, please do not hesitate to call  I sincerely appreciate the opportunity to share in the care of one of your patients and hope to have another opportunity to work with you in the near future  Sincerely,    Anthony Trent, PT      Referring Provider:      I certify that I have read the below Plan of Care and certify the need for these services furnished under this plan of treatment while under my care  Moriah Decker PA-C  [address] [place] [address]  VIA Facsimile: [phone]          PT Evaluation     Today's date: 1/15/2020  Patient name: Denise Owens  : 1990  MRN: [id number]  Referring provider: Lexie Lopez PA-C  Dx:   Encounter Diagnosis     ICD-10-CM    1  Right rotator cuff tendonitis M75 81        Start Time: 1700  Stop Time: 1745  Total time in clinic (min): 45 minutes    Assessment  Assessment details: Denise Owens is a 30 y o  female who presents to the clinic with signs and symptoms consistent with right rotator cuff tendonitis that occurred 3 weeks ago while at work  The patient presents with the above listed impairments  She demonstrates decreased right shoulder range of motion, decreased strength, activity intolerance and pain with function  This has impacted her ability to don/doff her clothing, lift her arm above shoulder height, lift and carry objects, perform ADLs such as washing her hair and perform her job duties all without pain    The patient is eager to improve her condition and should benefit from skilled physical therapy in order to increase range of motion, increase strength, decrease pain, and improve overall functional performance  Thank you for the referral         Impairments: abnormal muscle firing, abnormal muscle tone, abnormal or restricted ROM, activity intolerance, impaired physical strength, lacks appropriate home exercise program, pain with function, weight-bearing intolerance and poor posture   Understanding of Dx/Px/POC: good   Prognosis: good    Goals  Impairment Goals:  1  Patient will increase right shoulder flexion range of motion to 150 deg in 6 weeks  2  Patient will increase right shoulder abduction range of motion to 150 deg in 6 weeks  3  Patient will increase right shoulder internal rotation range of motion to WNL in 6 weeks  4  Patient will increase right shoulder external rotation range of motion to WNL in 6 weeks  5  Patient will increase shoulder strength in all planes equal to left in 6 weeks     Functional Goals:  1  Patient will be independent with HEP by discharge  2  Patient will achieve FOTO scores above normal values by discharge  3  The patient will be able to don/doff her coat/bra without complaints of pain in 6 weeks  4  Patient will be able to carry 5 lb objects without complaints of pain in 6 weeks  5  Patient will be able to wash her hair without complaints of pain in 6 weeks  6  Patient will be able to perform her job duties without complaints of pain in 6 weeks  7    Patient will be able to return to prior level of function by discharge         Plan  Patient would benefit from: skilled physical therapy  Planned modality interventions: TENS, thermotherapy: hydrocollator packs and cryotherapy  Planned therapy interventions: home exercise program, graded exercise, graded activity, functional ROM exercises, flexibility, therapeutic exercise, therapeutic activities, stretching, strengthening, patient education, neuromuscular re-education, muscle pump exercises, Arora taping, massage, manual therapy and joint mobilization  Frequency: 2x week  Duration in visits: 6  Treatment plan discussed with: patient        Subjective Evaluation    History of Present Illness  Mechanism of injury: HPI: The patient stated 3 weeks ago, she was trying to pull out a stack of boxes at work and felt pop in her right shoulder  She had immediate pain afterwards, but continued to work through the pain  X-rays were performed and were negative for any pathology  She currently uses a sling at work in order to limit her pain  Patient notes difficulty with all movements of her right shoulder which is her dominant side  She was then referred to physical therapy  Pain Location: Right upper trap down to triceps   Occupation: Package    Prior Functional Limitations:  Independent prior to onset   AGG: Putting on a coat, writing, carrying itemes, washing her hair, donning a bra, working   Ease: heat, massage  Patient Goals: Patient stated "I would like to get better and go back to work "   Pain  Current pain ratin  At best pain ratin  At worst pain ratin  Location: R upper trap to triceps   Quality: dull ache and pulling          Objective     Active Range of Motion   Left Shoulder   Flexion: 165 degrees   Abduction: 170 degrees     Right Shoulder   Flexion: 91 degrees with pain  Abduction: 89 degrees with pain  External rotation 45°:  45 degrees with pain  Internal rotation 45°:  42 degrees with pain    Passive Range of Motion     Right Shoulder   External rotation 45°:  45 degrees   Internal rotation 45°:  42 degrees     Strength/Myotome Testing     Left Shoulder     Planes of Motion   Flexion: 5   Abduction: 5   External rotation at 0°:  5   Internal rotation at 0°:  5     Right Shoulder     Planes of Motion   Flexion: 3+ (pain)   Abduction: 3+ (pain)   External rotation at 0°:  3+ (pain) External rotation BTH: 3+ (pain)     Tests     Right Shoulder   Positive empty can, Hawkin's and painful arc         Flowsheet Rows      Most Recent Value   PT/OT G-Codes   Current Score  46   Projected Score  67              Diagnosis:    Precautions:    Manuals        PROM        Mobs                        Exercise Diary        UBE                Rows         Upper Trap S        Pulleys         Scap Squeezes        Tband Rows        Tband Ext        Overhead cane         U S  [employer] ABD        U S  [employer] ER         [employer] IR/ER                                                                                Modalities             CP PRN

## 2020-01-15 NOTE — PROGRESS NOTES
PT Evaluation     Today's date: 1/15/2020  Patient name: Mahnaz Lozoya  : 1990  MRN: [id number]  Referring provider: Bridger Curry PA-C  Dx:   Encounter Diagnosis     ICD-10-CM    1  Right rotator cuff tendonitis M75 81        Start Time: 1700  Stop Time: 1745  Total time in clinic (min): 45 minutes    Assessment  Assessment details: Mahnaz Lozoya is a 30 y o  female who presents to the clinic with signs and symptoms consistent with right rotator cuff tendonitis that occurred 3 weeks ago while at work  The patient presents with the above listed impairments  She demonstrates decreased right shoulder range of motion, decreased strength, activity intolerance and pain with function  This has impacted her ability to don/doff her clothing, lift her arm above shoulder height, lift and carry objects, perform ADLs such as washing her hair and perform her job duties all without pain  The patient is eager to improve her condition and should benefit from skilled physical therapy in order to increase range of motion, increase strength, decrease pain, and improve overall functional performance  Thank you for the referral         Impairments: abnormal muscle firing, abnormal muscle tone, abnormal or restricted ROM, activity intolerance, impaired physical strength, lacks appropriate home exercise program, pain with function, weight-bearing intolerance and poor posture   Understanding of Dx/Px/POC: good   Prognosis: good    Goals  Impairment Goals:  1  Patient will increase right shoulder flexion range of motion to 150 deg in 6 weeks  2  Patient will increase right shoulder abduction range of motion to 150 deg in 6 weeks  3  Patient will increase right shoulder internal rotation range of motion to WNL in 6 weeks  4  Patient will increase right shoulder external rotation range of motion to WNL in 6 weeks  5  Patient will increase shoulder strength in all planes equal to left in 6 weeks     Functional Goals:  1  Patient will be independent with HEP by discharge  2  Patient will achieve FOTO scores above normal values by discharge  3  The patient will be able to don/doff her coat/bra without complaints of pain in 6 weeks  4  Patient will be able to carry 5 lb objects without complaints of pain in 6 weeks  5  Patient will be able to wash her hair without complaints of pain in 6 weeks  6  Patient will be able to perform her job duties without complaints of pain in 6 weeks  7  Patient will be able to return to prior level of function by discharge         Plan  Patient would benefit from: skilled physical therapy  Planned modality interventions: TENS, thermotherapy: hydrocollator packs and cryotherapy  Planned therapy interventions: home exercise program, graded exercise, graded activity, functional ROM exercises, flexibility, therapeutic exercise, therapeutic activities, stretching, strengthening, patient education, neuromuscular re-education, muscle pump exercises, Arora taping, massage, manual therapy and joint mobilization  Frequency: 2x week  Duration in visits: 6  Treatment plan discussed with: patient        Subjective Evaluation    History of Present Illness  Mechanism of injury: HPI: The patient stated 3 weeks ago, she was trying to pull out a stack of boxes at work and felt pop in her right shoulder  She had immediate pain afterwards, but continued to work through the pain  X-rays were performed and were negative for any pathology  She currently uses a sling at work in order to limit her pain  Patient notes difficulty with all movements of her right shoulder which is her dominant side  She was then referred to physical therapy  Pain Location: Right upper trap down to triceps   Occupation: Package    Prior Functional Limitations:  Independent prior to onset   AGG: Putting on a coat, writing, carrying itemes, washing her hair, donning a bra, working   Ease: heat, massage  Patient Goals: Patient stated "I would like to get better and go back to work "   Pain  Current pain ratin  At best pain ratin  At worst pain ratin  Location: R upper trap to triceps   Quality: dull ache and pulling          Objective     Active Range of Motion   Left Shoulder   Flexion: 165 degrees   Abduction: 170 degrees     Right Shoulder   Flexion: 91 degrees with pain  Abduction: 89 degrees with pain  External rotation 45°: 45 degrees with pain  Internal rotation 45°: 42 degrees with pain    Passive Range of Motion     Right Shoulder   External rotation 45°: 45 degrees   Internal rotation 45°: 42 degrees     Strength/Myotome Testing     Left Shoulder     Planes of Motion   Flexion: 5   Abduction: 5   External rotation at 0°: 5   Internal rotation at 0°: 5     Right Shoulder     Planes of Motion   Flexion: 3+ (pain)   Abduction: 3+ (pain)   External rotation at 0°: 3+ (pain)   External rotation BTH: 3+ (pain)     Tests     Right Shoulder   Positive empty can, Hawkin's and painful arc         Flowsheet Rows      Most Recent Value   PT/OT G-Codes   Current Score  46   Projected Score  67              Diagnosis:    Precautions:    Manuals        PROM        Mobs                        Exercise Diary        UBE                Rows         Upper Trap S        Pulleys         Scap Squeezes        Tband Rows        Tband Ext        Overhead cane         [employer] ABD        [employer] ER         [employer] IR/ER                                                                                Modalities             CP PRN

## 2020-01-16 ENCOUNTER — TRANSCRIBE ORDERS (OUTPATIENT)
Dept: PHYSICAL THERAPY | Facility: CLINIC | Age: 30
End: 2020-01-16

## 2020-01-16 DIAGNOSIS — M75.81 RIGHT ROTATOR CUFF TENDONITIS: Primary | ICD-10-CM

## 2020-01-22 ENCOUNTER — APPOINTMENT (OUTPATIENT)
Dept: PHYSICAL THERAPY | Facility: CLINIC | Age: 30
End: 2020-01-22
Payer: COMMERCIAL

## 2020-01-24 ENCOUNTER — APPOINTMENT (OUTPATIENT)
Dept: PHYSICAL THERAPY | Facility: CLINIC | Age: 30
End: 2020-01-24
Payer: COMMERCIAL

## 2020-01-27 ENCOUNTER — APPOINTMENT (OUTPATIENT)
Dept: PHYSICAL THERAPY | Facility: CLINIC | Age: 30
End: 2020-01-27
Payer: COMMERCIAL

## 2020-01-29 NOTE — PROGRESS NOTES
Assessment/Plan:     Bacterial vaginosis noted on wet mount  Rx sent to requested pharmacy  No sex during treatment  Complete all medication as directed  Call with any recurrence of symptoms  Condom use encouraged  Boric acid suppository intravaginally twice weekly  GC/CT done  Req given for HIV and RPR testing          Diagnoses and all orders for this visit:    BV (bacterial vaginosis)  -     POCT wet mount  -     boric acid; Boric acid in size 0 gel caps intravaginally twice weekly    Possible exposure to STD  -     Chlamydia/GC amplified DNA by PCR  -     POCT wet mount  -     Human Immunodeficiency Virus 1/2 Antigen / Antibody ( Fourth Generation) with Reflex Testing; Future  -     RPR; Future  -     boric acid; Boric acid in size 0 gel caps intravaginally twice weekly    Other orders  -     betamethasone dipropionate (DIPROSONE) 0 05 % cream              Subjective:      Patient ID: Melida Caruso is a 30 y o  female  Melida Caruso is a 30 y o  female who is here today for a problem visit  She is having frequent bacterial infection symptoms  Malodorous thick white discharge that is ruining her underwear  She has concerns that her partner may have cheated in the last month  He hasn't been staying at her house the past month  She is symptomatic after each coitus-vaginal irritation and odor  She had dysuria after voiding post coital last week  Resolved now  No vaginal bleeding  No menses on her ASMITA  She admits to high stress  Her mother  unexpectedly from lung cancer /mets to liver   Complaint with her balziva  Melida Caruso is sexually active with male partner of 15 years-on and off  Desires STI testing  The following portions of the patient's history were reviewed and updated as appropriate: allergies, current medications, past family history, past medical history, past social history, past surgical history and problem list     Review of Systems   Constitutional: Negative      HENT: Negative for sore throat and trouble swallowing  Gastrointestinal: Negative for abdominal pain, constipation, diarrhea, nausea and vomiting  Genitourinary: Positive for vaginal discharge  Negative for dyspareunia, dysuria, genital sores, menstrual problem, pelvic pain, vaginal bleeding and vaginal pain  Vaginal irritation     Musculoskeletal: Negative for arthralgias and myalgias  Skin: Negative  Hematological: Negative for adenopathy  Psychiatric/Behavioral: Negative  All other systems reviewed and are negative  Objective:      /70 (BP Location: Left arm, Patient Position: Sitting, Cuff Size: Standard)   Pulse 56   Wt 79 5 kg (175 lb 3 2 oz)   BMI 31 04 kg/m²          Physical Exam   Constitutional: She is oriented to person, place, and time  She appears well-developed and well-nourished  Genitourinary: Uterus normal  Rectal exam shows no external hemorrhoid  Pelvic exam was performed with patient supine  No labial fusion  There is no rash, tenderness, lesion or injury on the right labia  There is no rash, tenderness, lesion or injury on the left labia  Cervix exhibits no motion tenderness, no discharge and no friability  Right adnexum displays no mass, no tenderness and no fullness  Left adnexum displays no mass, no tenderness and no fullness  No erythema, tenderness or bleeding in the vagina  No foreign body in the vagina  No signs of injury around the vagina  Vaginal discharge found  Musculoskeletal: Normal range of motion  Lymphadenopathy: No inguinal adenopathy noted on the right or left side  Right: No inguinal adenopathy present  Left: No inguinal adenopathy present  Neurological: She is alert and oriented to person, place, and time  Skin: Skin is warm and dry  Psychiatric: She has a normal mood and affect  Nursing note and vitals reviewed

## 2020-01-30 ENCOUNTER — APPOINTMENT (OUTPATIENT)
Dept: PHYSICAL THERAPY | Facility: CLINIC | Age: 30
End: 2020-01-30
Payer: COMMERCIAL

## 2020-01-30 ENCOUNTER — OFFICE VISIT (OUTPATIENT)
Dept: OBGYN CLINIC | Facility: CLINIC | Age: 30
End: 2020-01-30
Payer: COMMERCIAL

## 2020-01-30 VITALS
DIASTOLIC BLOOD PRESSURE: 70 MMHG | WEIGHT: 175.2 LBS | BODY MASS INDEX: 31.04 KG/M2 | SYSTOLIC BLOOD PRESSURE: 112 MMHG | HEART RATE: 56 BPM

## 2020-01-30 DIAGNOSIS — B96.89 BV (BACTERIAL VAGINOSIS): Primary | ICD-10-CM

## 2020-01-30 DIAGNOSIS — Z20.2 POSSIBLE EXPOSURE TO STD: ICD-10-CM

## 2020-01-30 DIAGNOSIS — N76.0 BV (BACTERIAL VAGINOSIS): Primary | ICD-10-CM

## 2020-01-30 LAB
BV WHIFF TEST VAG QL: ABNORMAL
CLUE CELLS SPEC QL WET PREP: ABNORMAL
PH SMN: ABNORMAL [PH]
SL AMB POCT WET MOUNT: ABNORMAL
T VAGINALIS VAG QL WET PREP: ABNORMAL
YEAST VAG QL WET PREP: ABNORMAL

## 2020-01-30 PROCEDURE — 99214 OFFICE O/P EST MOD 30 MIN: CPT | Performed by: NURSE PRACTITIONER

## 2020-01-30 PROCEDURE — 87210 SMEAR WET MOUNT SALINE/INK: CPT | Performed by: NURSE PRACTITIONER

## 2020-01-30 PROCEDURE — 87591 N.GONORRHOEAE DNA AMP PROB: CPT | Performed by: NURSE PRACTITIONER

## 2020-01-30 PROCEDURE — 87491 CHLMYD TRACH DNA AMP PROBE: CPT | Performed by: NURSE PRACTITIONER

## 2020-01-30 RX ORDER — BORIC ACID
POWDER (GRAM) MISCELLANEOUS
Qty: 1 BOTTLE | Refills: 3 | Status: SHIPPED | OUTPATIENT
Start: 2020-01-30 | End: 2020-07-13 | Stop reason: ALTCHOICE

## 2020-01-30 RX ORDER — BETAMETHASONE DIPROPIONATE 0.5 MG/G
CREAM TOPICAL
COMMUNITY
Start: 2020-01-09 | End: 2020-07-13 | Stop reason: ALTCHOICE

## 2020-01-30 NOTE — PATIENT INSTRUCTIONS
Bacterial vaginosis noted on wet mount  Rx sent to requested pharmacy  No sex during treatment  Complete all medication as directed  Call with any recurrence of symptoms     Condom use encouraged  Boric acid suppository intravaginally twice weekly  GC/CT done  Req given for HIV and RPR testing

## 2020-01-31 LAB
C TRACH DNA SPEC QL NAA+PROBE: NEGATIVE
N GONORRHOEA DNA SPEC QL NAA+PROBE: NEGATIVE

## 2020-02-04 NOTE — PROGRESS NOTES
PT Discharge    Today's date: 2020  Patient name: López Young  : 1990  MRN: [id number]  Referring provider: Kati Hathaway PA-C  Dx:   Encounter Diagnosis     ICD-10-CM    1  Right rotator cuff tendonitis M75 81      Patient has been non-compliant with attending physical therapy sessions  Due to this, she will be discharged at this time        Assessment/Plan    Subjective    Objective    Flowsheet Rows      Most Recent Value   PT/OT G-Codes   Current Score  46   Projected Score  67

## 2020-02-07 ENCOUNTER — TELEPHONE (OUTPATIENT)
Dept: GYNECOLOGY | Facility: CLINIC | Age: 30
End: 2020-02-07

## 2020-02-07 DIAGNOSIS — B96.89 BV (BACTERIAL VAGINOSIS): Primary | ICD-10-CM

## 2020-02-07 DIAGNOSIS — N76.0 BV (BACTERIAL VAGINOSIS): Primary | ICD-10-CM

## 2020-02-07 RX ORDER — METRONIDAZOLE 500 MG/1
500 TABLET ORAL EVERY 12 HOURS SCHEDULED
Qty: 14 TABLET | Refills: 0 | Status: SHIPPED | OUTPATIENT
Start: 2020-02-07 | End: 2020-02-14

## 2020-02-07 NOTE — TELEPHONE ENCOUNTER
Pt was seen at [place], states she was supposed to   Flagyl from pharmacy but the pharmacy did not receive it   Can you send it to [facility] on file

## 2020-03-10 ENCOUNTER — OPTICAL OFFICE (OUTPATIENT)
Dept: URBAN - METROPOLITAN AREA CLINIC 146 | Facility: CLINIC | Age: 30
Setting detail: OPHTHALMOLOGY
End: 2020-03-10
Payer: COMMERCIAL

## 2020-03-10 ENCOUNTER — DOCTOR'S OFFICE (OUTPATIENT)
Dept: URBAN - METROPOLITAN AREA CLINIC 137 | Facility: CLINIC | Age: 30
Setting detail: OPHTHALMOLOGY
End: 2020-03-10
Payer: COMMERCIAL

## 2020-03-10 DIAGNOSIS — H52.13: ICD-10-CM

## 2020-03-10 PROBLEM — H04.123 DRY EYE; BOTH EYES: Status: ACTIVE | Noted: 2018-06-13

## 2020-03-10 PROCEDURE — 92310 CONTACT LENS FITTING OU: CPT | Performed by: OPTOMETRIST

## 2020-03-10 PROCEDURE — S0500 DISPOS CONT LENS: HCPCS | Performed by: OPTOMETRIST

## 2020-03-10 PROCEDURE — 92014 COMPRE OPH EXAM EST PT 1/>: CPT | Performed by: OPTOMETRIST

## 2020-03-10 ASSESSMENT — VISUAL ACUITY
OD_BCVA: 20/20-1
OS_BCVA: 20/20

## 2020-03-10 ASSESSMENT — REFRACTION_MANIFEST
OS_AXIS: 180
OU_VA: 20/20
OD_VA2: 20/20(J1+)
OS_AXIS: 90
OS_VA1: 20/20
OS_SPHERE: -4.50
OD_VA1: 20/20
OU_VA: 20/20
OS_CYLINDER: -0.75
OD_SPHERE: -4.50
OD_SPHERE: -4.75
OD_VA1: 20/20
OD_CYLINDER: SPH
OS_CYLINDER: +0.50
OS_VA2: 20/20(J1+)
OS_SPHERE: -3.75
OS_VA1: 20/20

## 2020-03-10 ASSESSMENT — REFRACTION_AUTOREFRACTION
OS_CYLINDER: -1.50
OS_SPHERE: -3.50
OD_AXIS: 007
OS_AXIS: 003
OD_SPHERE: -4.75
OD_CYLINDER: -1.00

## 2020-03-10 ASSESSMENT — SPHEQUIV_DERIVED
OS_SPHEQUIV: -4.25
OS_SPHEQUIV: -4.125
OS_SPHEQUIV: -4.25
OD_SPHEQUIV: -5.25

## 2020-03-10 ASSESSMENT — KERATOMETRY
OD_AXISANGLE_DEGREES: 005
OD_K2POWER_DIOPTERS: 47.75
OS_K2POWER_DIOPTERS: 48.00
OS_AXISANGLE_DEGREES: 169
OS_K1POWER_DIOPTERS: 46.50
OD_K1POWER_DIOPTERS: 46.75

## 2020-03-10 ASSESSMENT — CONFRONTATIONAL VISUAL FIELD TEST (CVF)
OS_FINDINGS: FULL
OD_FINDINGS: FULL

## 2020-03-10 ASSESSMENT — AXIALLENGTH_DERIVED
OS_AL: 23.8736
OS_AL: 23.8736
OS_AL: 23.8239
OD_AL: 24.2794

## 2020-07-13 ENCOUNTER — APPOINTMENT (EMERGENCY)
Dept: RADIOLOGY | Facility: HOSPITAL | Age: 30
End: 2020-07-13
Payer: COMMERCIAL

## 2020-07-13 ENCOUNTER — HOSPITAL ENCOUNTER (EMERGENCY)
Facility: HOSPITAL | Age: 30
Discharge: HOME | End: 2020-07-13
Payer: COMMERCIAL

## 2020-07-13 VITALS
HEIGHT: 63 IN | DIASTOLIC BLOOD PRESSURE: 88 MMHG | SYSTOLIC BLOOD PRESSURE: 136 MMHG | OXYGEN SATURATION: 100 % | BODY MASS INDEX: 31.01 KG/M2 | WEIGHT: 175 LBS | TEMPERATURE: 98.6 F | HEART RATE: 85 BPM | RESPIRATION RATE: 18 BRPM

## 2020-07-13 DIAGNOSIS — S60.419A: Primary | ICD-10-CM

## 2020-07-13 DIAGNOSIS — S16.1XXA CERVICAL STRAIN: ICD-10-CM

## 2020-07-13 DIAGNOSIS — S46.912A SHOULDER STRAIN, LEFT, INITIAL ENCOUNTER: ICD-10-CM

## 2020-07-13 PROCEDURE — 73030 X-RAY EXAM OF SHOULDER: CPT

## 2020-07-13 PROCEDURE — 99284 EMERGENCY DEPT VISIT MOD MDM: CPT

## 2020-07-13 PROCEDURE — 73130 X-RAY EXAM OF HAND: CPT

## 2020-07-13 PROCEDURE — 73090 X-RAY EXAM OF FOREARM: CPT

## 2020-07-13 PROCEDURE — 72040 X-RAY EXAM NECK SPINE 2-3 VW: CPT

## 2020-07-13 RX ORDER — ACETAMINOPHEN 325 MG/1
650 TABLET ORAL ONCE
Status: COMPLETED | OUTPATIENT
Start: 2020-07-13 | End: 2020-07-13

## 2020-07-13 RX ADMIN — ACETAMINOPHEN 650 MG: 325 TABLET, FILM COATED ORAL at 08:14

## 2020-07-13 NOTE — DISCHARGE INSTRUCTIONS
Local wound care for the wound on the hand  Follow up with Dr Magali Spaulding in next week if any complications signs of infection of finger
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
## 2020-08-31 DIAGNOSIS — B00.9 HSV INFECTION: ICD-10-CM

## 2020-08-31 RX ORDER — VALACYCLOVIR HYDROCHLORIDE 500 MG/1
500 TABLET, FILM COATED ORAL DAILY
Qty: 30 TABLET | Refills: 11 | OUTPATIENT
Start: 2020-08-31 | End: 2021-08-26

## 2020-09-14 DIAGNOSIS — Z30.41 ENCOUNTER FOR SURVEILLANCE OF CONTRACEPTIVE PILLS: ICD-10-CM

## 2020-09-14 RX ORDER — NORETHINDRONE AND ETHINYL ESTRADIOL 0.4-0.035
KIT ORAL
Qty: 28 TABLET | Refills: 2 | OUTPATIENT
Start: 2020-09-14

## 2020-09-23 ENCOUNTER — ANNUAL EXAM (OUTPATIENT)
Dept: OBGYN CLINIC | Facility: CLINIC | Age: 30
End: 2020-09-23
Payer: COMMERCIAL

## 2020-09-23 VITALS
DIASTOLIC BLOOD PRESSURE: 74 MMHG | TEMPERATURE: 97.1 F | WEIGHT: 183.8 LBS | BODY MASS INDEX: 32.56 KG/M2 | SYSTOLIC BLOOD PRESSURE: 114 MMHG

## 2020-09-23 DIAGNOSIS — A60.00 GENITAL HERPES SIMPLEX, UNSPECIFIED SITE: ICD-10-CM

## 2020-09-23 DIAGNOSIS — Z30.41 ENCOUNTER FOR SURVEILLANCE OF CONTRACEPTIVE PILLS: ICD-10-CM

## 2020-09-23 DIAGNOSIS — Z11.3 SCREEN FOR STD (SEXUALLY TRANSMITTED DISEASE): ICD-10-CM

## 2020-09-23 DIAGNOSIS — N60.19 FIBROCYSTIC BREAST DISEASE (FCBD), UNSPECIFIED LATERALITY: ICD-10-CM

## 2020-09-23 DIAGNOSIS — Z01.419 WELL WOMAN EXAM WITH ROUTINE GYNECOLOGICAL EXAM: Primary | ICD-10-CM

## 2020-09-23 DIAGNOSIS — N76.0 BV (BACTERIAL VAGINOSIS): ICD-10-CM

## 2020-09-23 DIAGNOSIS — B96.89 BV (BACTERIAL VAGINOSIS): ICD-10-CM

## 2020-09-23 LAB
BV WHIFF TEST VAG QL: ABNORMAL
CLUE CELLS SPEC QL WET PREP: ABNORMAL
PH SMN: 5 [PH]
SL AMB POCT WET MOUNT: ABNORMAL
T VAGINALIS VAG QL WET PREP: ABNORMAL
YEAST VAG QL WET PREP: ABNORMAL

## 2020-09-23 PROCEDURE — 87491 CHLMYD TRACH DNA AMP PROBE: CPT | Performed by: OBSTETRICS & GYNECOLOGY

## 2020-09-23 PROCEDURE — 87591 N.GONORRHOEAE DNA AMP PROB: CPT | Performed by: OBSTETRICS & GYNECOLOGY

## 2020-09-23 PROCEDURE — 99395 PREV VISIT EST AGE 18-39: CPT | Performed by: OBSTETRICS & GYNECOLOGY

## 2020-09-23 PROCEDURE — 87210 SMEAR WET MOUNT SALINE/INK: CPT | Performed by: OBSTETRICS & GYNECOLOGY

## 2020-09-23 RX ORDER — NORETHINDRONE AND ETHINYL ESTRADIOL 0.4-0.035
KIT ORAL
Qty: 84 TABLET | Refills: 4 | Status: SHIPPED | OUTPATIENT
Start: 2020-09-23 | End: 2021-07-29 | Stop reason: SDUPTHER

## 2020-09-23 RX ORDER — VALACYCLOVIR HYDROCHLORIDE 500 MG/1
500 TABLET, FILM COATED ORAL DAILY
Qty: 180 TABLET | Refills: 3 | Status: SHIPPED | OUTPATIENT
Start: 2020-09-23 | End: 2021-09-22

## 2020-09-23 RX ORDER — METRONIDAZOLE 500 MG/1
500 TABLET ORAL 2 TIMES DAILY
Qty: 14 TABLET | Refills: 0 | Status: SHIPPED | OUTPATIENT
Start: 2020-09-23 | End: 2020-09-30

## 2020-09-23 NOTE — PATIENT INSTRUCTIONS
Guidelines for Vulvar Skin Care    NOTE:     The goal is to promote healthy vulvar skin  This is done by decreasing and/or removing any chemicals, moisture, or rubbing (friction)  Any products listed below have been suggested for use because of their past success in helping to decrease or relieve vulvar/vaginal itching and burning  LAUNDRY PRODUCTS   Use a detergent free of dyes, enzymes and perfumes (such as ALL-Free and Clear or Earth-Rite) on any clothing that comes in contact with your vulva such as your underwear, exercise clothes, towels, or pajama bottoms  Use 1/3 to 1/2 the suggested amount per load  Other clothing may be washed in the laundry soap of your choice   Do not use a fabric softener in the washer or dryer on these articles of clothing  If you do use dryer sheets with the rest of your clothes, for any loads, you must hang dry your underwear, towels, and any other clothing that comes in contact with your vulva   Stain Removing Products  Soak and rinse in clear water all underwear and towels on which you have used a stain removing product  Then wash in your regular washing cycle  This removes as much of the product as possible  CLOTHING   Wear white all cotton underwear, not nylon with a cotton crotch  Cotton allows air in and moisture out   Avoid pantyhose  If you must wear them, either cut out the francis crotch (if you cut out the crotch be sure to leave about 1/4 to 1/2 inch of fabric from the seam to prevent running) or wear thigh high hose  Many stores now carry thigh high nylons   Avoid tight clothing, especially clothing made of synthetic fabrics  Remove wet bathing and exercise clothing as soon as you can  BATHING AND HYGIENE   Avoid bath soaps, lotions, gels, etc  which contain perfumes  These may smell nice but can be irritating  This includes many baby products and feminine hygiene products marked "gentle" or "mild"   Dove-Hypoallergenic, Neutrogena, Basis, or Pears are the soaps we suggest  Do not use soap directly on the vulvar skin; just warm water and your hand will keep the vulvar area clean without irritating the skin   Avoid all bubble baths, bath salts and scented oils  You may apply a neutral (unscented, non-perfumed) oil such as Patito Oil to damp skin after getting out of the tub or shower  Do not apply oils directly to the vulva   Do not scrub vulvar skin with a washcloth, washing with your hand and warm water is enough for good cleaning   Pat dry rather than rubbing with a towel  Or use a hairdryer on a cool setting to dry the vulva   Baking Soda soaks  Soak in lukewarm (not hot) bath water with 4-5 tablespoons of baking soda to help soothe vulvar itching and burning  Soak 1 to 3 times a day for 10-15 minutes   Cool Compresses: Apply cool compresses to the vulva for 15-20 minutes at a time, up to three times per day for burning or itching  Do not use for prolonged amounts of time as this can lead to damage to the skin   Use white, unscented toilet paper  If paper has a perfumed scent or lotion, avoid using it   Avoid all feminine hygiene sprays, perfumes, adult, or baby wipes  Pour lukewarm water over the vulva after urinating if urine causes burning of the skin  Pat dry rather than rubbing with a towel   Avoid the use of deodorized pads and tampons  Tampons should be used when the blood flow is heavy enough to soak one tampon in four hours or less  Tampons are safe for most women, but wearing them too long or when the blood flow is light may result in vaginal infection, increased discharge, odor, or toxic shock syndrome  Also, use only pads that have a cotton liner that comes in contact with your skin  You can obtain all cotton pads from [employer]   Avoid all over the counter creams or ointments, except A&D Ointment   Ask your health care provider first    Small amounts of A&D Ointment may be applied to your vulva as often as needed to protect the skin  It may also help to decrease skin irritation during your period and when you urinate  Brands that have been helpful are the [place] brand, [employer]  brand, [employer] brand, or [facility] brand   DO NOT DOUCHE  Baking soda soaks will help rinse away extra discharge and help with odor   DO NOT SHAVE the vulvar area   Some women may have problems with chronic dampness  Keeping dry is important   Choose cotton fabrics whenever you can   Keep an extra pair of underwear with you in a small bag and change if you become damp during the day at work/school   Gold Bond Powder or Zeosorb Powder may be applied to the vulva and groin area one to two times per day to help absorb moisture  Dryness and irritation of the vagina and vulva may be helped by using an emoillent  Use a small amount of a pure vegetable oil such as Crisco or olive oil  The vegetable oils contain no chemicals to irritate vulvar/vaginal skin  Vegetable oils will rinse away with water and will not increase your chances of infection  You can also use Vitamin E oil or coconut oil  You can apply the emoillent as often as needed for dryness and irritation; I recommend at least once daily, but you can apply more frequently  You can also use the emollient during intercourse  Water-based products like K-Y Jelly are helpful, but may tend to dry before intercourse is over and also contain chemicals that can irritate your vulvar skin  It may be helpful to use a non-lubricated, non-spermicidal condom, and use vegetable oil as the lubricant  This will help keep the semen off the skin which can decrease burning and irritation after intercourse

## 2020-09-23 NOTE — PROGRESS NOTES
ASSESSMENT & PLAN:   Diagnoses and all orders for this visit:    Well woman exam with routine gynecological exam    Screen for STD (sexually transmitted disease)  -     HIV 1/2 Antigen/Antibody (4th Generation) w Reflex [facility]; Future  -     RPR; Future  -     Hepatitis B surface antigen; Future  -     Chlamydia/GC amplified DNA by PCR    Encounter for surveillance of contraceptive pills  -     norethindrone-ethinyl estradiol (Balziva) 0 4-35 MG-MCG per tablet; TAKE 1 TABLET BY MOUTH EVERY DAY AVOID PLACEBO PILL FOR 2 PACKS, THEN TAKE DAILY INCLUDING PLACEBOS    Genital herpes simplex, unspecified site  -     valACYclovir (VALTREX) 500 mg tablet; Take 1 tablet (500 mg total) by mouth daily    Fibrocystic breast disease (FCBD), unspecified laterality  -     US breast left limited (diagnostic); Future  -     US breast right limited (diagnostic); Future    BV (bacterial vaginosis)  -     metroNIDAZOLE (FLAGYL) 500 mg tablet; Take 1 tablet (500 mg total) by mouth 2 (two) times a day for 7 days  - Wet mount/deepti performed  The following were reviewed in today's visit: ASCCP guidelines, Gardisil vaccination, STD testing breast self exam, STD testing, use and side effects of OCPs, exercise and healthy diet  Patient to return to office in yearly for annual exam      All questions have been answered to her satisfaction  CC:  Annual Gynecologic Examination    HPI: Rodriguez Patel is a 26 y o  [id number] who presents for annual gynecologic examination  She has the following concerns:      1  Infection - thnks BV, has recurrent issues  Discussed metrogel x 2 weeks, then weekly which she declined  Vulvar skin care guidelines given  2  STD testing  3  Refills on OCPs  4  Refills on valacyclovir        Health Maintenance:    She exercises 1 days per week  She does not wear her seatbelt routinely  She is practicing breast self awareness  Patient does try to follow a balanced diet        Past Medical History: Diagnosis Date    Anxiety     Blood type A+     Depression     Herpes     High blood pressure     High cholesterol     Hypertension        Past Surgical History:   Procedure Laterality Date    BIOPSY CORE NEEDLE      INDUCED   2010    by D&C     TUBAL LIGATION      WISDOM TOOTH EXTRACTION         Past OB/Gyn History:  Period Duration (Days): 7  Period Pattern: (!) Irregular  Menstrual Flow: LightNo LMP recorded  (Menstrual status: Birth Control)  History of sexually transmitted infection herpes, chlamydia/gonorrhea, is interested in STD testing today  Patient is currently sexually active  Birth control:oral contraceptives (estrogen/progesterone)  Gardisil Vaccination completed: yes  Last Pap  2018 :  NILM, HPV negative    Family History  Family History   Problem Relation Age of Onset   [place] Breast cancer Mother 29    Lung cancer Mother     Hypertension Father     Hyperlipidemia Father     Asthma Son     ADD / ADHD Son     No Known Problems Sister     No Known Problems Brother     Hypertension Maternal Grandfather     Breast cancer Other     No Known Problems Sister     Cancer Paternal Aunt     Asthma Other        Social History:  Social History     Socioeconomic History    Marital status: Single     Spouse name: Not on file    Number of children: Not on file    Years of education: 9    Highest education level: Not on file   Occupational History    Occupation: [employer]  Social Needs    Financial resource strain: Not on file    Food insecurity     Worry: Not on file     Inability: Not on file    Transportation needs     Medical: Not on file     Non-medical: Not on file   Tobacco Use    Smoking status: Current Every Day Smoker     Packs/day: 0 25     Years: 15 00     Pack years: 3 75    Smokeless tobacco: Never Used    Tobacco comment: 1 a day    Substance and Sexual Activity    Alcohol use: Yes     Comment: occas      Drug use: Yes     Types: Marijuana    Sexual activity: Yes     Birth control/protection: Female Sterilization, OCP   Lifestyle    Physical activity     Days per week: Not on file     Minutes per session: Not on file    Stress: Not on file   Relationships    Social connections     Talks on phone: Not on file     Gets together: Not on file     Attends [employer] service: Not on file     Active member of club or organization: Not on file     Attends meetings of clubs or organizations: Not on file     Relationship status: Not on file    Intimate partner violence     Fear of current or ex partner: Not on file     Emotionally abused: Not on file     Physically abused: Not on file     Forced sexual activity: Not on file   Other Topics Concern    Not on file   Social History Narrative    Daily caffeine consumption, 1 serv/day     Exercises 3-4 times per week         Most recent tobacco use screenin2019    Do you currently or have you served in the [employer]: No    Were you activated, into active duty, as a member of the [employer] or as a Reservist: No    Occupation: [employer]    Education: 9    Marital status: Single    Exercise level: Heavy    Diet: Regular    General stress level: High    Alcohol intake: Occasional    Caffeine intake: Occasional    Chewing tobacco: none    Illicit drugs: none    Guns present in home: No    Seat belts used routinely: No    Sunscreen used routinely: No    Smoke alarm in home: Yes    Salt Intake: moderate    Has the Patient had a mammogram to screen for breast cancer within 24 months: No    Insect repellent used routinely: No     Presently lives with kids  She feels safe at home  Patient is currently employed  Allergies:   Allergies   Allergen Reactions    Bee Venom Anaphylaxis    Aspirin Other (See Comments)     unknown    Latex     Motrin [Ibuprofen] Other (See Comments)     unknown    Strawberry Extract Other (See Comments)     Tingling and itching in throat while eating strawberries    Tramadol      Breathing issues  Medications:    Current Outpatient Medications:     Multiple Vitamins-Minerals (MULTIVITAMIN ADULT PO), Take by mouth, Disp: , Rfl:     norethindrone-ethinyl estradiol (Balziva) 0 4-35 MG-MCG per tablet, TAKE 1 TABLET BY MOUTH EVERY DAY AVOID PLACEBO PILL FOR 2 PACKS, THEN TAKE DAILY INCLUDING PLACEBOS, Disp: 84 tablet, Rfl: 4    ergocalciferol (VITAMIN D2) 50,000 units, Take 50,000 Units by mouth once a week Take as one dose, Disp: , Rfl: 0    valACYclovir (VALTREX) 500 mg tablet, Take 1 tablet (500 mg total) by mouth daily, Disp: 180 tablet, Rfl: 3    Review of Systems:  Review of Systems   Constitutional: Negative for chills, fever and unexpected weight change  Respiratory: Negative for cough and shortness of breath  Cardiovascular: Negative for chest pain and palpitations  Gastrointestinal: Negative for abdominal distention, abdominal pain, blood in stool, constipation, nausea and vomiting  Genitourinary: Positive for urgency  Negative for difficulty urinating, dyspareunia, dysuria, flank pain, frequency, genital sores, hematuria, menstrual problem, pelvic pain, vaginal bleeding, vaginal discharge and vaginal pain  Neurological: Negative for headaches  Physical Exam:  /74   Temp (!) 97 1 °F (36 2 °C)   Wt 83 4 kg (183 lb 12 8 oz)   BMI 32 56 kg/m²    Physical Exam  Constitutional:       General: She is awake  Appearance: Normal appearance  She is well-developed  Genitourinary:      Pelvic exam was performed with patient in the lithotomy position  Vulva, urethra, bladder, vagina and uterus normal       No vulval lesion, ulcerations or rash noted  No urethral prolapse present  Bladder is not distended or tender  No vaginal discharge, erythema, tenderness, bleeding, atrophy or prolapse  Cervix is parous  Cervical discharge present  No cervical motion tenderness, lesion or polyp  Uterus is mobile  Uterus is not enlarged, tender or irregular  No uterine mass detected  Uterus is regular  No right or left adnexal mass present  Right adnexa not tender or full  Left adnexa not tender or full  HENT:      Head: Normocephalic and atraumatic  Neck:      Musculoskeletal: Neck supple  Cardiovascular:      Rate and Rhythm: Normal rate and regular rhythm  Heart sounds: Normal heart sounds  Pulmonary:      Effort: Pulmonary effort is normal  No tachypnea or respiratory distress  Breath sounds: Normal breath sounds  Chest:      Breasts:         Right: Normal  No inverted nipple, mass, nipple discharge, skin change or tenderness  Left: Normal  No inverted nipple, mass, nipple discharge, skin change or tenderness  Abdominal:      General: There is no distension  Palpations: Abdomen is soft  Tenderness: There is no abdominal tenderness  There is no guarding  Lymphadenopathy:      Upper Body:      Right upper body: No supraclavicular or axillary adenopathy  Left upper body: No supraclavicular or axillary adenopathy  Neurological:      General: No focal deficit present  Mental Status: She is alert and oriented to person, place, and time  Psychiatric:         Mood and Affect: Mood normal          Behavior: Behavior normal          Thought Content: Thought content normal          Judgment: Judgment normal    Vitals signs reviewed  Wet Prep: Clue cells positive, Hyphae negative, Trichomonas negative  Whiff Test was performed and was positive   PH: 5

## 2020-10-02 LAB
C TRACH DNA SPEC QL NAA+PROBE: NEGATIVE
N GONORRHOEA DNA SPEC QL NAA+PROBE: NEGATIVE

## 2020-11-16 ENCOUNTER — TELEPHONE (OUTPATIENT)
Dept: OBGYN CLINIC | Facility: CLINIC | Age: 30
End: 2020-11-16

## 2020-11-16 DIAGNOSIS — N76.0 BV (BACTERIAL VAGINOSIS): Primary | ICD-10-CM

## 2020-11-16 DIAGNOSIS — B96.89 BV (BACTERIAL VAGINOSIS): Primary | ICD-10-CM

## 2020-11-16 RX ORDER — METRONIDAZOLE 500 MG/1
500 TABLET ORAL EVERY 12 HOURS SCHEDULED
Qty: 14 TABLET | Refills: 0 | Status: SHIPPED | OUTPATIENT
Start: 2020-11-16 | End: 2020-11-23

## 2020-11-23 ENCOUNTER — OFFICE VISIT (OUTPATIENT)
Dept: FAMILY MEDICINE CLINIC | Facility: CLINIC | Age: 30
End: 2020-11-23
Payer: COMMERCIAL

## 2020-11-23 VITALS
OXYGEN SATURATION: 99 % | SYSTOLIC BLOOD PRESSURE: 120 MMHG | RESPIRATION RATE: 16 BRPM | HEIGHT: 63 IN | HEART RATE: 59 BPM | BODY MASS INDEX: 33.31 KG/M2 | DIASTOLIC BLOOD PRESSURE: 76 MMHG | WEIGHT: 188 LBS

## 2020-11-23 DIAGNOSIS — M79.641 PAIN OF RIGHT HAND: ICD-10-CM

## 2020-11-23 DIAGNOSIS — Z00.00 WELL ADULT EXAM: ICD-10-CM

## 2020-11-23 DIAGNOSIS — G43.C0 PERIODIC HEADACHE SYNDROME, NOT INTRACTABLE: ICD-10-CM

## 2020-11-23 DIAGNOSIS — E53.8 B12 DEFICIENCY: ICD-10-CM

## 2020-11-23 DIAGNOSIS — R10.13 EPIGASTRIC PAIN: ICD-10-CM

## 2020-11-23 DIAGNOSIS — Z79.899 OTHER LONG TERM (CURRENT) DRUG THERAPY: ICD-10-CM

## 2020-11-23 DIAGNOSIS — L40.50 PSORIATIC ARTHRITIS (HCC): ICD-10-CM

## 2020-11-23 DIAGNOSIS — E61.1 IRON DEFICIENCY: ICD-10-CM

## 2020-11-23 DIAGNOSIS — E66.09 CLASS 1 OBESITY DUE TO EXCESS CALORIES WITH SERIOUS COMORBIDITY AND BODY MASS INDEX (BMI) OF 33.0 TO 33.9 IN ADULT: ICD-10-CM

## 2020-11-23 DIAGNOSIS — E55.9 VITAMIN D DEFICIENCY: ICD-10-CM

## 2020-11-23 DIAGNOSIS — L40.9 PSORIASIS: Primary | ICD-10-CM

## 2020-11-23 DIAGNOSIS — F17.200 SMOKER: ICD-10-CM

## 2020-11-23 DIAGNOSIS — F41.9 ANXIETY: ICD-10-CM

## 2020-11-23 PROBLEM — E66.811 CLASS 1 OBESITY DUE TO EXCESS CALORIES WITH SERIOUS COMORBIDITY AND BODY MASS INDEX (BMI) OF 33.0 TO 33.9 IN ADULT: Status: ACTIVE | Noted: 2020-11-23

## 2020-11-23 PROCEDURE — 99395 PREV VISIT EST AGE 18-39: CPT | Performed by: FAMILY MEDICINE

## 2020-11-23 PROCEDURE — 3008F BODY MASS INDEX DOCD: CPT | Performed by: FAMILY MEDICINE

## 2020-11-23 RX ORDER — ESCITALOPRAM OXALATE 10 MG/1
10 TABLET ORAL DAILY
Qty: 90 TABLET | Refills: 1 | Status: SHIPPED | OUTPATIENT
Start: 2020-11-23 | End: 2021-06-21 | Stop reason: ALTCHOICE

## 2020-11-23 RX ORDER — CALCIPOTRIENE 50 UG/G
OINTMENT TOPICAL 2 TIMES DAILY
Qty: 60 G | Refills: 1 | Status: SHIPPED | OUTPATIENT
Start: 2020-11-23 | End: 2021-06-21 | Stop reason: ALTCHOICE

## 2020-11-23 RX ORDER — BUTALBITAL, ACETAMINOPHEN AND CAFFEINE 50; 325; 40 MG/1; MG/1; MG/1
1 TABLET ORAL EVERY 6 HOURS PRN
Qty: 30 TABLET | Refills: 0 | Status: SHIPPED | OUTPATIENT
Start: 2020-11-23 | End: 2021-06-21 | Stop reason: ALTCHOICE

## 2020-11-23 RX ORDER — NICOTINE 21 MG/24HR
1 PATCH, TRANSDERMAL 24 HOURS TRANSDERMAL EVERY 24 HOURS
Qty: 28 PATCH | Refills: 0 | Status: SHIPPED | OUTPATIENT
Start: 2020-11-23 | End: 2021-06-21 | Stop reason: ALTCHOICE

## 2020-11-23 RX ORDER — PANTOPRAZOLE SODIUM 40 MG/1
40 TABLET, DELAYED RELEASE ORAL DAILY
Qty: 14 TABLET | Refills: 0 | Status: SHIPPED | OUTPATIENT
Start: 2020-11-23 | End: 2021-06-21 | Stop reason: ALTCHOICE

## 2020-12-14 ENCOUNTER — OFFICE VISIT (OUTPATIENT)
Dept: FAMILY MEDICINE CLINIC | Facility: CLINIC | Age: 30
End: 2020-12-14
Payer: COMMERCIAL

## 2020-12-14 ENCOUNTER — TELEPHONE (OUTPATIENT)
Dept: OTHER | Facility: OTHER | Age: 30
End: 2020-12-14

## 2020-12-14 VITALS
DIASTOLIC BLOOD PRESSURE: 80 MMHG | BODY MASS INDEX: 33.49 KG/M2 | SYSTOLIC BLOOD PRESSURE: 120 MMHG | HEART RATE: 76 BPM | OXYGEN SATURATION: 98 % | RESPIRATION RATE: 16 BRPM | HEIGHT: 63 IN | WEIGHT: 189 LBS | TEMPERATURE: 97.8 F

## 2020-12-14 DIAGNOSIS — H61.22 IMPACTED CERUMEN OF LEFT EAR: Primary | ICD-10-CM

## 2020-12-14 PROCEDURE — 99213 OFFICE O/P EST LOW 20 MIN: CPT | Performed by: FAMILY MEDICINE

## 2020-12-14 PROCEDURE — 3008F BODY MASS INDEX DOCD: CPT | Performed by: FAMILY MEDICINE

## 2020-12-14 PROCEDURE — 69210 REMOVE IMPACTED EAR WAX UNI: CPT | Performed by: FAMILY MEDICINE

## 2020-12-14 PROCEDURE — 3725F SCREEN DEPRESSION PERFORMED: CPT | Performed by: FAMILY MEDICINE

## 2020-12-16 ENCOUNTER — OPTICAL OFFICE (OUTPATIENT)
Dept: URBAN - METROPOLITAN AREA CLINIC 146 | Facility: CLINIC | Age: 30
Setting detail: OPHTHALMOLOGY
End: 2020-12-16

## 2020-12-16 DIAGNOSIS — H52.13: ICD-10-CM

## 2020-12-16 PROCEDURE — S0500 DISPOS CONT LENS: HCPCS | Performed by: OPTOMETRIST

## 2021-01-15 ENCOUNTER — HOSPITAL ENCOUNTER (EMERGENCY)
Facility: HOSPITAL | Age: 31
Discharge: HOME | End: 2021-01-15
Attending: EMERGENCY MEDICINE | Admitting: EMERGENCY MEDICINE
Payer: COMMERCIAL

## 2021-01-15 ENCOUNTER — APPOINTMENT (EMERGENCY)
Dept: RADIOLOGY | Facility: HOSPITAL | Age: 31
End: 2021-01-15
Payer: COMMERCIAL

## 2021-01-15 VITALS
BODY MASS INDEX: 33.13 KG/M2 | WEIGHT: 187 LBS | HEART RATE: 64 BPM | RESPIRATION RATE: 18 BRPM | TEMPERATURE: 98.4 F | DIASTOLIC BLOOD PRESSURE: 37 MMHG | OXYGEN SATURATION: 99 % | SYSTOLIC BLOOD PRESSURE: 101 MMHG | HEIGHT: 63 IN

## 2021-01-15 DIAGNOSIS — R07.9 CHEST PAIN: Primary | ICD-10-CM

## 2021-01-15 LAB
ANION GAP SERPL CALCULATED.3IONS-SCNC: 9 MMOL/L (ref 4–13)
ATRIAL RATE: 76 BPM
BASOPHILS # BLD AUTO: 0.02 THOUSANDS/ΜL (ref 0–0.1)
BASOPHILS NFR BLD AUTO: 0 % (ref 0–1)
BUN SERPL-MCNC: 15 MG/DL (ref 6–20)
CALCIUM SERPL-MCNC: 8.7 MG/DL (ref 8.4–10.2)
CHLORIDE SERPL-SCNC: 104 MMOL/L (ref 96–108)
CO2 SERPL-SCNC: 23 MMOL/L (ref 22–33)
CREAT SERPL-MCNC: 0.58 MG/DL (ref 0.4–1.1)
D DIMER PPP FEU-MCNC: 0.25 MG/L FEU (ref 0.19–0.49)
EOSINOPHIL # BLD AUTO: 0.08 THOUSAND/ΜL (ref 0–0.61)
EOSINOPHIL NFR BLD AUTO: 1 % (ref 0–6)
ERYTHROCYTE [DISTWIDTH] IN BLOOD BY AUTOMATED COUNT: 12.3 % (ref 11.6–15.1)
GFR SERPL CREATININE-BSD FRML MDRD: 122 ML/MIN/1.73SQ M
GLUCOSE SERPL-MCNC: 87 MG/DL (ref 65–140)
HCT VFR BLD AUTO: 40.3 % (ref 34.8–46.1)
HGB BLD-MCNC: 13.9 G/DL (ref 11.5–15.4)
IMM GRANULOCYTES # BLD AUTO: 0.02 THOUSAND/UL (ref 0–0.2)
IMM GRANULOCYTES NFR BLD AUTO: 0 % (ref 0–2)
LYMPHOCYTES # BLD AUTO: 2.11 THOUSANDS/ΜL (ref 0.6–4.47)
LYMPHOCYTES NFR BLD AUTO: 23 % (ref 14–44)
MCH RBC QN AUTO: 30.2 PG (ref 26.8–34.3)
MCHC RBC AUTO-ENTMCNC: 34.5 G/DL (ref 31.4–37.4)
MCV RBC AUTO: 87 FL (ref 82–98)
MONOCYTES # BLD AUTO: 0.7 THOUSAND/ΜL (ref 0.17–1.22)
MONOCYTES NFR BLD AUTO: 8 % (ref 4–12)
NEUTROPHILS # BLD AUTO: 6.09 THOUSANDS/ΜL (ref 1.85–7.62)
NEUTS SEG NFR BLD AUTO: 68 % (ref 43–75)
P AXIS: 47 DEGREES
PLATELET # BLD AUTO: 202 THOUSANDS/UL (ref 149–390)
PMV BLD AUTO: 11.3 FL (ref 8.9–12.7)
POTASSIUM SERPL-SCNC: 3.7 MMOL/L (ref 3.5–5)
PR INTERVAL: 145 MS
QRS AXIS: 7 DEGREES
QRSD INTERVAL: 103 MS
QT INTERVAL: 410 MS
QTC INTERVAL: 462 MS
RBC # BLD AUTO: 4.61 MILLION/UL (ref 3.81–5.12)
SODIUM SERPL-SCNC: 136 MMOL/L (ref 133–145)
T WAVE AXIS: 43 DEGREES
TROPONIN I SERPL-MCNC: <0.03 NG/ML (ref 0–0.07)
VENTRICULAR RATE: 76 BPM
WBC # BLD AUTO: 9.02 THOUSAND/UL (ref 4.31–10.16)

## 2021-01-15 PROCEDURE — 84484 ASSAY OF TROPONIN QUANT: CPT | Performed by: EMERGENCY MEDICINE

## 2021-01-15 PROCEDURE — 85025 COMPLETE CBC W/AUTO DIFF WBC: CPT | Performed by: EMERGENCY MEDICINE

## 2021-01-15 PROCEDURE — 99285 EMERGENCY DEPT VISIT HI MDM: CPT

## 2021-01-15 PROCEDURE — 93010 ELECTROCARDIOGRAM REPORT: CPT | Performed by: INTERNAL MEDICINE

## 2021-01-15 PROCEDURE — 93005 ELECTROCARDIOGRAM TRACING: CPT

## 2021-01-15 PROCEDURE — 71045 X-RAY EXAM CHEST 1 VIEW: CPT

## 2021-01-15 PROCEDURE — 80048 BASIC METABOLIC PNL TOTAL CA: CPT | Performed by: EMERGENCY MEDICINE

## 2021-01-15 PROCEDURE — 99284 EMERGENCY DEPT VISIT MOD MDM: CPT | Performed by: EMERGENCY MEDICINE

## 2021-01-15 PROCEDURE — 85379 FIBRIN DEGRADATION QUANT: CPT | Performed by: EMERGENCY MEDICINE

## 2021-01-15 PROCEDURE — 36415 COLL VENOUS BLD VENIPUNCTURE: CPT | Performed by: EMERGENCY MEDICINE

## 2021-01-15 RX ORDER — SODIUM CHLORIDE 9 MG/ML
3 INJECTION INTRAVENOUS
Status: DISCONTINUED | OUTPATIENT
Start: 2021-01-15 | End: 2021-01-15 | Stop reason: HOSPADM

## 2021-01-15 NOTE — ED PROVIDER NOTES
History  Chief Complaint   Patient presents with    Chest Pain     Pt presents to ER from home for reports of intermittent chest pains over the last few weeks but worse over last few weeks  Pain that radiates to both arms - worse  on L - and jaw  Denies cough, cold, congestion, fever  This is a 49-year-old female who presents to the emergency department complaining of chest pain  The patient states the pain has been going on for few weeks  She states it is located throughout her chest at different times  It is intermittent in nature  It comes and goes and lasts only a few seconds  She states that is described as a pressure and severe when it comes  She states she had a severe episode today  She is on birth control which contains estrogen  She denies any difficulty breathing  She denies coughing up any blood  She denies leg swelling  Prior to Admission Medications   Prescriptions Last Dose Informant Patient Reported? Taking?    butalbital-acetaminophen-caffeine (FIORICET,ESGIC) -40 mg per tablet Not Taking at Unknown time  No No   Sig: Take 1 tablet by mouth every 6 (six) hours as needed for headaches   Patient not taking: Reported on 1/15/2021   calcipotriene (DOVONOX) 0 005 % ointment 1/15/2021 at Unknown time  No Yes   Sig: Apply topically 2 (two) times a day   ergocalciferol (VITAMIN D2) 50,000 units Not Taking at Unknown time  Yes No   Sig: Take 50,000 Units by mouth once a week Take as one dose   escitalopram (LEXAPRO) 10 mg tablet Not Taking at Unknown time  No No   Sig: Take 1 tablet (10 mg total) by mouth daily   Patient not taking: Reported on 1/15/2021   nicotine (NICODERM CQ) 21 mg/24 hr TD 24 hr patch Not Taking at Unknown time  No No   Sig: Place 1 patch on the skin every 24 hours   Patient not taking: Reported on 1/15/2021   norethindrone-ethinyl estradiol López Guerra) 0 4-35 MG-MCG per tablet 1/15/2021 at Unknown time  No Yes   Sig: TAKE 1 TABLET BY MOUTH EVERY DAY AVOID PLACEBO PILL FOR 2 PACKS, THEN TAKE DAILY INCLUDING PLACEBOS   pantoprazole (PROTONIX) 40 mg tablet Not Taking at Unknown time  No No   Sig: Take 1 tablet (40 mg total) by mouth daily   Patient not taking: Reported on 1/15/2021   valACYclovir (VALTREX) 500 mg tablet   No No   Sig: Take 1 tablet (500 mg total) by mouth daily      Facility-Administered Medications: None       Past Medical History:   Diagnosis Date    Anxiety     Blood type A+     Depression     Herpes     High blood pressure     High cholesterol     Hypertension        Past Surgical History:   Procedure Laterality Date    BIOPSY CORE NEEDLE      INDUCED   2010    by D&C     TUBAL LIGATION      WISDOM TOOTH EXTRACTION         Family History   Problem Relation Age of Onset   [place] Breast cancer Mother 29    Lung cancer Mother     Hypertension Father     Hyperlipidemia Father     Asthma Son     ADD / ADHD Son     No Known Problems Sister     No Known Problems Brother     Hypertension Maternal Grandfather     Breast cancer Other     No Known Problems Sister     Cancer Paternal Aunt     Asthma Other      I have reviewed and agree with the history as documented  E-Cigarette/Vaping    E-Cigarette Use Never User      E-Cigarette/Vaping Substances    Nicotine No     THC No     CBD No     Flavoring No     Other No     Unknown No      Social History     Tobacco Use    Smoking status: Current Every Day Smoker     Packs/day: 1 00     Years: 15 00     Pack years: 15 00    Smokeless tobacco: Never Used    Tobacco comment: "trying to quit" 2021   Substance Use Topics    Alcohol use: Yes     Comment: occas   Drug use: Yes     Types: Marijuana       Review of Systems   All other systems reviewed and are negative        Physical Exam  Physical Exam  Constitutional:  Vital signs reviewed, patient appears nontoxic, no acute distress  Eyes: Pupils equal round reactive to light and accommodation, extraocular muscles intact  HEENT: trachea midline, no JVD, moist mucous membranes  Respiratory: lung sounds clear throughout, no rhonchi, no rales  Cardiovascular: regular rate rhythm, no murmurs or rubs  Abdomen: soft, nontender, nondistended, no rebound or guarding  Back: no CVA tenderness, normal inspection  Extremities: no edema, pulses equal in all 4 extremities  Neuro: awake, alert, oriented, no focal weakness  Skin: warm, dry, intact, no rashes noted    Vital Signs  ED Triage Vitals [01/15/21 1519]   Temperature Pulse Respirations Blood Pressure SpO2   98 4 °F (36 9 °C) 80 16 128/63 100 %      Temp Source Heart Rate Source Patient Position - Orthostatic VS BP Location FiO2 (%)   Oral Monitor Lying Left arm --      Pain Score       5           Vitals:    01/15/21 1519 01/15/21 1639   BP: 128/63 (!) 101/37   Pulse: 80 64   Patient Position - Orthostatic VS: Lying Sitting         Visual Acuity  Visual Acuity      Most Recent Value   L Pupil Size (mm)  3   R Pupil Size (mm)  3          ED Medications  Medications   sodium chloride (PF) 0 9 % injection 3 mL (has no administration in time range)       Diagnostic Studies  Results Reviewed     Procedure Component Value Units Date/Time    Troponin I [[id number]]  (Normal) Collected: 01/15/21 1548    Lab Status: Final result Specimen: Blood from Arm, Left Updated: 01/15/21 1618     Troponin I <0 03 ng/mL     Basic metabolic panel [[id number]] Collected: 01/15/21 1548    Lab Status: Final result Specimen: Blood from Arm, Left Updated: 01/15/21 1616     Sodium 136 mmol/L      Potassium 3 7 mmol/L      Chloride 104 mmol/L      CO2 23 mmol/L      ANION GAP 9 mmol/L      BUN 15 mg/dL      Creatinine 0 58 mg/dL      Glucose 87 mg/dL      Calcium 8 7 mg/dL      eGFR 122 ml/min/1 73sq m     Narrative:      Rip guidelines for Chronic Kidney Disease (CKD):     Stage 1 with normal or high GFR (GFR > 90 mL/min/1 73 square meters)    Stage 2 Mild CKD (GFR = 60-89 mL/min/1 73 square meters)    Stage 3A Moderate CKD (GFR = 45-59 mL/min/1 73 square meters)    Stage 3B Moderate CKD (GFR = 30-44 mL/min/1 73 square meters)    Stage 4 Severe CKD (GFR = 15-29 mL/min/1 73 square meters)    Stage 5 End Stage CKD (GFR <15 mL/min/1 73 square meters)  Note: GFR calculation is accurate only with a steady state creatinine    D-dimer, quantitative [[id number]]  (Normal) Collected: 01/15/21 1548    Lab Status: Final result Specimen: Blood from Arm, Left Updated: 01/15/21 1609     D-Dimer, Quant  0 25 mg/L FEU     CBC and differential [[id number]]  (Normal) Collected: 01/15/21 1548    Lab Status: Final result Specimen: Blood from Arm, Left Updated: 01/15/21 1555     WBC 9 02 Thousand/uL      RBC 4 61 Million/uL      Hemoglobin 13 9 g/dL      Hematocrit 40 3 %      MCV 87 fL      MCH 30 2 pg      MCHC 34 5 g/dL      RDW 12 3 %      MPV 11 3 fL      Platelets 763 Thousands/uL      Neutrophils Relative 68 %      Immat GRANS % 0 %      Lymphocytes Relative 23 %      Monocytes Relative 8 %      Eosinophils Relative 1 %      Basophils Relative 0 %      Neutrophils Absolute 6 09 Thousands/µL      Immature Grans Absolute 0 02 Thousand/uL      Lymphocytes Absolute 2 11 Thousands/µL      Monocytes Absolute 0 70 Thousand/µL      Eosinophils Absolute 0 08 Thousand/µL      Basophils Absolute 0 02 Thousands/µL                  X-ray chest 1 view portable   ED Interpretation by Jatinder Irby DO (01/15 1609)   No pna, no ptx        Final Result by Jesus Alberto Perrin MD (01/15 1657)      No acute cardiopulmonary disease                    Workstation performed: [id number]                    Procedures  ECG 12 Lead Documentation Only    Date/Time: 1/15/2021 3:47 PM  Performed by: Jatinder Irby DO  Authorized by: Jatinder Irby DO     ECG reviewed by me, the ED Provider: yes    Patient location:  ED  Comments:      Normal sinus rhythm, rate 76, normal MS, normal QTC, no STEMI             ED Course  ED Course as of Hermann 15 1749   Mariana Cochran Hermann 15, 2021   1609 Patient had a chest x-ray that was interpreted by me that shows no pneumonia or pneumothorax  2988 The patient had lab work significant for a negative troponin, normal D-dimer, and a normal CBC and Chem 7  The patient was well-appearing on exam   She read maintained her saturations throughout the entire stay  She was not tachycardic or tachypneic  The patient was discharged with follow-up to her primary care physician  HEART Risk Score      Most Recent Value   Heart Score Risk Calculator   History  0 Filed at: 01/15/2021 1650   ECG  0 Filed at: 01/15/2021 1650   Age  0 Filed at: 01/15/2021 1650   Risk Factors  0 Filed at: 01/15/2021 1650   Troponin  0 Filed at: 01/15/2021 1650   HEART Score  0 Filed at: 01/15/2021 1650                                    MDM  Number of Diagnoses or Management Options  Diagnosis management comments: This is a 64-year-old female presented to the emergency department complaining of chest pain  I considered pneumonia, pneumothorax, PE, ACS, arrhythmia  These and other diagnoses were considered  Amount and/or Complexity of Data Reviewed  Clinical lab tests: ordered and reviewed  Tests in the radiology section of CPT®: ordered and reviewed  Tests in the medicine section of CPT®: ordered and reviewed  Independent visualization of images, tracings, or specimens: yes        Disposition  Final diagnoses:   Chest pain     Time reflects when diagnosis was documented in both MDM as applicable and the Disposition within this note     Time User Action Codes Description Comment    1/15/2021  4:51 PM Lamount Form Add [R07 9] Chest pain       ED Disposition     ED Disposition Condition Date/Time Comment    Discharge Stable Fri Hermann 15, 2021  4:51 PM Diego Acosta discharge to home/self care              Follow-up Information     Follow up With Specialties Details Why Contact Info    Antolin Huitron MD Family Medicine [address]  [address]  [place] [address] [place] [address]  [phone]            Discharge Medication List as of 1/15/2021  4:51 PM      CONTINUE these medications which have NOT CHANGED    Details   calcipotriene (DOVONOX) 0 005 % ointment Apply topically 2 (two) times a day, Starting Mon 11/23/2020, Normal      norethindrone-ethinyl estradiol (Balziva) 0 4-35 MG-MCG per tablet TAKE 1 TABLET BY MOUTH EVERY DAY AVOID PLACEBO PILL FOR 2 PACKS, THEN TAKE DAILY INCLUDING PLACEBOS, Normal      butalbital-acetaminophen-caffeine (FIORICET,ESGIC) -40 mg per tablet Take 1 tablet by mouth every 6 (six) hours as needed for headaches, Starting Mon 11/23/2020, Normal      ergocalciferol (VITAMIN D2) 50,000 units Take 50,000 Units by mouth once a week Take as one dose, Starting Tue 5/7/2019, Historical Med      escitalopram (LEXAPRO) 10 mg tablet Take 1 tablet (10 mg total) by mouth daily, Starting Mon 11/23/2020, Normal      nicotine (NICODERM CQ) 21 mg/24 hr TD 24 hr patch Place 1 patch on the skin every 24 hours, Starting Mon 11/23/2020, Normal      pantoprazole (PROTONIX) 40 mg tablet Take 1 tablet (40 mg total) by mouth daily, Starting Mon 11/23/2020, Normal      valACYclovir (VALTREX) 500 mg tablet Take 1 tablet (500 mg total) by mouth daily, Starting Wed 9/23/2020, Until Tue 12/22/2020, Normal           No discharge procedures on file      PDMP Review     None          ED Provider  Electronically Signed by           Gia Jones DO  01/15/21 2380

## 2021-03-26 ENCOUNTER — TRANSCRIBE ORDERS (OUTPATIENT)
Dept: ADMINISTRATIVE | Facility: HOSPITAL | Age: 31
End: 2021-03-26

## 2021-03-26 DIAGNOSIS — N64.4 PAIN OF BOTH BREASTS: Primary | ICD-10-CM

## 2021-03-26 DIAGNOSIS — N63.0 LUMP IN FEMALE BREAST: ICD-10-CM

## 2021-04-06 ENCOUNTER — HOSPITAL ENCOUNTER (OUTPATIENT)
Dept: ULTRASOUND IMAGING | Facility: CLINIC | Age: 31
Discharge: HOME | End: 2021-04-06
Payer: COMMERCIAL

## 2021-04-06 ENCOUNTER — HOSPITAL ENCOUNTER (OUTPATIENT)
Dept: MAMMOGRAPHY | Facility: CLINIC | Age: 31
Discharge: HOME | End: 2021-04-06
Payer: COMMERCIAL

## 2021-04-06 VITALS — BODY MASS INDEX: 33.13 KG/M2 | WEIGHT: 187 LBS | HEIGHT: 63 IN

## 2021-04-06 DIAGNOSIS — N63.0 LUMP IN FEMALE BREAST: ICD-10-CM

## 2021-04-06 DIAGNOSIS — N64.4 PAIN OF BOTH BREASTS: ICD-10-CM

## 2021-04-06 PROCEDURE — 77066 DX MAMMO INCL CAD BI: CPT

## 2021-04-06 PROCEDURE — 76642 ULTRASOUND BREAST LIMITED: CPT

## 2021-04-06 PROCEDURE — G0279 TOMOSYNTHESIS, MAMMO: HCPCS

## 2021-04-07 DIAGNOSIS — Z12.39 BREAST CANCER SCREENING, HIGH RISK PATIENT: Primary | ICD-10-CM

## 2021-04-07 NOTE — RESULT ENCOUNTER NOTE
We have received the results of your mammogram   It was read as BI-RAD 2, which is benign  Routine screening mammogram is recommended in 1 year  Based on the [address], you are identified as being at elevated risk for breast cancer  Options for additional screening include automated breast ultrasound (ABUS), which can be done 6 months from your mammogram  I have placed this order for you  Another option is a breast MRI, which is not covered by insurance at this time   [facility] offer it out of our [facility] office for a fee of $300  Please contact the office if you are interested in pursuing the breast MRI  You can call [phone] now to schedule the ABUS in 6 months  Please contact our office with any questions or concerns      Andrei

## 2021-05-19 ENCOUNTER — NURSE TRIAGE (OUTPATIENT)
Dept: OTHER | Facility: OTHER | Age: 31
End: 2021-05-19

## 2021-05-19 DIAGNOSIS — Z11.59 SPECIAL SCREENING EXAMINATION FOR VIRAL DISEASE: Primary | ICD-10-CM

## 2021-05-19 DIAGNOSIS — Z11.59 SPECIAL SCREENING EXAMINATION FOR VIRAL DISEASE: ICD-10-CM

## 2021-05-19 LAB — SARS-COV-2 RNA RESP QL NAA+PROBE: NEGATIVE

## 2021-05-19 PROCEDURE — U0003 INFECTIOUS AGENT DETECTION BY NUCLEIC ACID (DNA OR RNA); SEVERE ACUTE RESPIRATORY SYNDROME CORONAVIRUS 2 (SARS-COV-2) (CORONAVIRUS DISEASE [COVID-19]), AMPLIFIED PROBE TECHNIQUE, MAKING USE OF HIGH THROUGHPUT TECHNOLOGIES AS DESCRIBED BY CMS-2020-01-R: HCPCS | Performed by: FAMILY MEDICINE

## 2021-05-19 PROCEDURE — U0005 INFEC AGEN DETEC AMPLI PROBE: HCPCS | Performed by: FAMILY MEDICINE

## 2021-05-19 NOTE — TELEPHONE ENCOUNTER
Reason for Disposition   [1] COVID-19 infection suspected by caller or triager AND [2] mild symptoms (cough, fever, or others) AND [9] no complications or SOB    Protocols used: CORONAVIRUS (COVID-19) DIAGNOSED OR SUSPECTED-ADULT-[place]

## 2021-05-19 NOTE — TELEPHONE ENCOUNTER
1  Were you within 6 feet or less, for up to 15 minutes or more with a person that has a confirmed COVID-19 test? no  2  What was the date of your exposure? unknown  3  Are you experiencing any symptoms attributed to the virus?  (Assess for SOB, cough, fever, difficulty breathing) congestion, sore throat, headaches  4  HIGH RISK: Do you have any history heart or lung conditions, weakened immune system, diabetes, Asthma, CHF, HIV, COPD, Chemo, renal failure, sickle cell, etc? Allergies   5   PREGNANCY: Are you pregnant or did you recently give birth? no

## 2021-05-25 ENCOUNTER — LAB (OUTPATIENT)
Dept: LAB | Facility: HOSPITAL | Age: 31
End: 2021-05-25
Payer: COMMERCIAL

## 2021-05-25 DIAGNOSIS — E53.8 B12 DEFICIENCY: ICD-10-CM

## 2021-05-25 DIAGNOSIS — Z11.3 SCREEN FOR STD (SEXUALLY TRANSMITTED DISEASE): ICD-10-CM

## 2021-05-25 DIAGNOSIS — E61.1 IRON DEFICIENCY: ICD-10-CM

## 2021-05-25 DIAGNOSIS — E55.9 VITAMIN D DEFICIENCY: ICD-10-CM

## 2021-05-25 DIAGNOSIS — F17.200 SMOKER: ICD-10-CM

## 2021-05-25 DIAGNOSIS — Z00.00 WELL ADULT EXAM: ICD-10-CM

## 2021-05-25 DIAGNOSIS — Z79.899 OTHER LONG TERM (CURRENT) DRUG THERAPY: ICD-10-CM

## 2021-05-25 LAB
25(OH)D3 SERPL-MCNC: 9.5 NG/ML (ref 30–100)
ALBUMIN SERPL BCP-MCNC: 4.4 G/DL (ref 3.4–4.8)
ALP SERPL-CCNC: 52.5 U/L (ref 35–140)
ALT SERPL W P-5'-P-CCNC: 19 U/L (ref 5–54)
ANION GAP SERPL CALCULATED.3IONS-SCNC: 8 MMOL/L (ref 4–13)
AST SERPL W P-5'-P-CCNC: 20 U/L (ref 15–41)
BACTERIA UR QL AUTO: ABNORMAL /HPF
BASOPHILS # BLD AUTO: 0.02 THOUSANDS/ΜL (ref 0–0.1)
BASOPHILS NFR BLD AUTO: 0 % (ref 0–1)
BILIRUB SERPL-MCNC: 0.54 MG/DL (ref 0.3–1.2)
BILIRUB UR QL STRIP: NEGATIVE
BUN SERPL-MCNC: 11 MG/DL (ref 6–20)
CALCIUM SERPL-MCNC: 9.3 MG/DL (ref 8.4–10.2)
CHLORIDE SERPL-SCNC: 105 MMOL/L (ref 96–108)
CHOLEST SERPL-MCNC: 180 MG/DL
CLARITY UR: ABNORMAL
CO2 SERPL-SCNC: 25 MMOL/L (ref 22–33)
COLOR UR: YELLOW
CREAT SERPL-MCNC: 0.62 MG/DL (ref 0.4–1.1)
EOSINOPHIL # BLD AUTO: 0.09 THOUSAND/ΜL (ref 0–0.61)
EOSINOPHIL NFR BLD AUTO: 1 % (ref 0–6)
ERYTHROCYTE [DISTWIDTH] IN BLOOD BY AUTOMATED COUNT: 12.4 % (ref 11.6–15.1)
EST. AVERAGE GLUCOSE BLD GHB EST-MCNC: 91 MG/DL
FERRITIN SERPL-MCNC: 33 NG/ML (ref 8–388)
GFR SERPL CREATININE-BSD FRML MDRD: 120 ML/MIN/1.73SQ M
GLUCOSE P FAST SERPL-MCNC: 103 MG/DL (ref 70–105)
GLUCOSE UR STRIP-MCNC: NEGATIVE MG/DL
HBA1C MFR BLD: 4.8 %
HBV SURFACE AG SER QL: NORMAL
HCT VFR BLD AUTO: 44.8 % (ref 34.8–46.1)
HDLC SERPL-MCNC: 49 MG/DL
HGB BLD-MCNC: 15.1 G/DL (ref 11.5–15.4)
HGB UR QL STRIP.AUTO: ABNORMAL
IMM GRANULOCYTES # BLD AUTO: 0.01 THOUSAND/UL (ref 0–0.2)
IMM GRANULOCYTES NFR BLD AUTO: 0 % (ref 0–2)
IRON SATN MFR SERPL: 14 %
IRON SERPL-MCNC: 82 UG/DL (ref 50–170)
KETONES UR STRIP-MCNC: NEGATIVE MG/DL
LDLC SERPL CALC-MCNC: 115 MG/DL (ref 0–100)
LEUKOCYTE ESTERASE UR QL STRIP: ABNORMAL
LYMPHOCYTES # BLD AUTO: 2.28 THOUSANDS/ΜL (ref 0.6–4.47)
LYMPHOCYTES NFR BLD AUTO: 22 % (ref 14–44)
MCH RBC QN AUTO: 29.4 PG (ref 26.8–34.3)
MCHC RBC AUTO-ENTMCNC: 33.7 G/DL (ref 31.4–37.4)
MCV RBC AUTO: 87 FL (ref 82–98)
MONOCYTES # BLD AUTO: 0.56 THOUSAND/ΜL (ref 0.17–1.22)
MONOCYTES NFR BLD AUTO: 6 % (ref 4–12)
NEUTROPHILS # BLD AUTO: 7.25 THOUSANDS/ΜL (ref 1.85–7.62)
NEUTS SEG NFR BLD AUTO: 71 % (ref 43–75)
NITRITE UR QL STRIP: NEGATIVE
NON-SQ EPI CELLS URNS QL MICRO: ABNORMAL /HPF
NONHDLC SERPL-MCNC: 131 MG/DL
PH UR STRIP.AUTO: 5.5 [PH]
PLATELET # BLD AUTO: 270 THOUSANDS/UL (ref 149–390)
PMV BLD AUTO: 11.5 FL (ref 8.9–12.7)
POTASSIUM SERPL-SCNC: 4 MMOL/L (ref 3.5–5)
PROT SERPL-MCNC: 8 G/DL (ref 6.4–8.3)
PROT UR STRIP-MCNC: NEGATIVE MG/DL
RBC # BLD AUTO: 5.13 MILLION/UL (ref 3.81–5.12)
RBC #/AREA URNS AUTO: ABNORMAL /HPF
SODIUM SERPL-SCNC: 138 MMOL/L (ref 133–145)
SP GR UR STRIP.AUTO: >=1.03 (ref 1–1.03)
TIBC SERPL-MCNC: 570 UG/DL (ref 250–450)
TRIGL SERPL-MCNC: 81.6 MG/DL
TSH SERPL DL<=0.05 MIU/L-ACNC: 1.27 UIU/ML (ref 0.34–5.6)
UROBILINOGEN UR QL STRIP.AUTO: 0.2 E.U./DL
VIT B12 SERPL-MCNC: 670 PG/ML (ref 100–900)
WBC # BLD AUTO: 10.21 THOUSAND/UL (ref 4.31–10.16)
WBC #/AREA URNS AUTO: ABNORMAL /HPF

## 2021-05-25 PROCEDURE — 83540 ASSAY OF IRON: CPT

## 2021-05-25 PROCEDURE — 83550 IRON BINDING TEST: CPT

## 2021-05-25 PROCEDURE — 36415 COLL VENOUS BLD VENIPUNCTURE: CPT

## 2021-05-25 PROCEDURE — 80053 COMPREHEN METABOLIC PANEL: CPT

## 2021-05-25 PROCEDURE — 82607 VITAMIN B-12: CPT

## 2021-05-25 PROCEDURE — 81001 URINALYSIS AUTO W/SCOPE: CPT

## 2021-05-25 PROCEDURE — 80061 LIPID PANEL: CPT

## 2021-05-25 PROCEDURE — 82728 ASSAY OF FERRITIN: CPT

## 2021-05-25 PROCEDURE — 82306 VITAMIN D 25 HYDROXY: CPT

## 2021-05-25 PROCEDURE — 84443 ASSAY THYROID STIM HORMONE: CPT

## 2021-05-25 PROCEDURE — 83036 HEMOGLOBIN GLYCOSYLATED A1C: CPT

## 2021-05-25 PROCEDURE — 86592 SYPHILIS TEST NON-TREP QUAL: CPT

## 2021-05-25 PROCEDURE — 85025 COMPLETE CBC W/AUTO DIFF WBC: CPT

## 2021-05-25 PROCEDURE — 87340 HEPATITIS B SURFACE AG IA: CPT

## 2021-05-25 PROCEDURE — 87086 URINE CULTURE/COLONY COUNT: CPT

## 2021-05-25 PROCEDURE — 81003 URINALYSIS AUTO W/O SCOPE: CPT

## 2021-05-25 PROCEDURE — 87389 HIV-1 AG W/HIV-1&-2 AB AG IA: CPT

## 2021-05-26 LAB
HIV 1+2 AB+HIV1 P24 AG SERPL QL IA: NORMAL
RPR SER QL: NORMAL

## 2021-05-27 ENCOUNTER — TELEPHONE (OUTPATIENT)
Dept: FAMILY MEDICINE CLINIC | Facility: CLINIC | Age: 31
End: 2021-05-27

## 2021-05-27 DIAGNOSIS — E55.9 VITAMIN D DEFICIENCY: ICD-10-CM

## 2021-05-27 DIAGNOSIS — N39.0 URINARY TRACT INFECTION WITHOUT HEMATURIA, SITE UNSPECIFIED: Primary | ICD-10-CM

## 2021-05-27 LAB — BACTERIA UR CULT: NORMAL

## 2021-05-27 RX ORDER — ERGOCALCIFEROL 1.25 MG/1
50000 CAPSULE ORAL WEEKLY
Qty: 12 CAPSULE | Refills: 0 | Status: SHIPPED | OUTPATIENT
Start: 2021-05-27

## 2021-05-27 RX ORDER — CEPHALEXIN 500 MG/1
500 CAPSULE ORAL EVERY 8 HOURS SCHEDULED
Qty: 21 CAPSULE | Refills: 0 | Status: SHIPPED | OUTPATIENT
Start: 2021-05-27 | End: 2021-06-03

## 2021-05-27 NOTE — TELEPHONE ENCOUNTER
----- Message from Anai Arias MD sent at 5/27/2021  6:14 AM EDT -----  uti seen   vitD is low   Watch the chol   And if your energy is low consider iron if it doesn hurt your stomach too much     Ill send the vit D and abx

## 2021-06-21 ENCOUNTER — OFFICE VISIT (OUTPATIENT)
Dept: FAMILY MEDICINE CLINIC | Facility: CLINIC | Age: 31
End: 2021-06-21
Payer: COMMERCIAL

## 2021-06-21 VITALS
DIASTOLIC BLOOD PRESSURE: 76 MMHG | BODY MASS INDEX: 35.37 KG/M2 | RESPIRATION RATE: 16 BRPM | OXYGEN SATURATION: 99 % | HEIGHT: 63 IN | SYSTOLIC BLOOD PRESSURE: 120 MMHG | WEIGHT: 199.6 LBS | HEART RATE: 71 BPM

## 2021-06-21 DIAGNOSIS — Z11.3 SCREEN FOR STD (SEXUALLY TRANSMITTED DISEASE): ICD-10-CM

## 2021-06-21 DIAGNOSIS — L40.9 PSORIASIS: ICD-10-CM

## 2021-06-21 DIAGNOSIS — R07.89 ATYPICAL CHEST PAIN: ICD-10-CM

## 2021-06-21 DIAGNOSIS — F17.210 CIGARETTE NICOTINE DEPENDENCE WITHOUT COMPLICATION: ICD-10-CM

## 2021-06-21 DIAGNOSIS — R00.2 PALPITATION: ICD-10-CM

## 2021-06-21 DIAGNOSIS — F41.9 ANXIETY: Primary | ICD-10-CM

## 2021-06-21 DIAGNOSIS — E61.1 IRON DEFICIENCY: ICD-10-CM

## 2021-06-21 DIAGNOSIS — L30.9 ECZEMA, UNSPECIFIED TYPE: ICD-10-CM

## 2021-06-21 DIAGNOSIS — Z82.49 FAMILY HISTORY OF HEART DISEASE: ICD-10-CM

## 2021-06-21 PROCEDURE — 3725F SCREEN DEPRESSION PERFORMED: CPT | Performed by: FAMILY MEDICINE

## 2021-06-21 PROCEDURE — 99214 OFFICE O/P EST MOD 30 MIN: CPT | Performed by: FAMILY MEDICINE

## 2021-06-21 PROCEDURE — 3008F BODY MASS INDEX DOCD: CPT | Performed by: FAMILY MEDICINE

## 2021-06-21 PROCEDURE — 4004F PT TOBACCO SCREEN RCVD TLK: CPT | Performed by: FAMILY MEDICINE
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
## 2021-07-28 NOTE — PROGRESS NOTES
Assessment/Plan:  Bacterial vaginosis and yeast  noted on wet mount  Use your boric acid suppositories nightly intravaginally x 21 nights  Rx sent to requested pharmacy  Start metronidazole on day 7 of boric acid suppositories  No sex during treatment  Complete all medication as directed  Consider daily probiotic  Call with any recurrence of symptoms  GC/CT culture done as requested  Birth control refill sent to pharmacy on file x 2 months  Return to office in 9/21 for annual exam    Keep breast pain calendar  Diagnoses and all orders for this visit:    BV (bacterial vaginosis)  -     POCT wet mount  -     metroNIDAZOLE (FLAGYL) 500 mg tablet; Take 1 tablet (500 mg total) by mouth every 12 (twelve) hours for 7 days    Vaginal yeast infection  -     POCT wet mount    Encounter for surveillance of contraceptive pills  -     norethindrone-ethinyl estradiol ([place] Prude) 0 4-35 MG-MCG per tablet; TAKE 1 TABLET BY MOUTH EVERY DAY AVOID PLACEBO PILL FOR 2 PACKS, THEN TAKE DAILY INCLUDING PLACEBOS    Routine screening for STI (sexually transmitted infection)  -     Chlamydia/GC amplified DNA by PCR  -     POCT wet mount          Subjective:      Patient ID: Eulalia Matthew is a 33 y o  female  Eulalia Matthew is a 33 y o  female who is here today for a problem visit   C/o thick white colored vaginal discharge with slight intermittent vaginal odor  She believes she has BV  Also c/o external vaginal itching and admits to having been recently on antibiotics for a UTI  Irregular menses twice yearly x 7 days with varied flow  Menses is not acceptable  She is requesting a refill of her ASMITA until her annual exam in 9/21  Eulalia Matthew is sexually active with male partner/FOB of 17 years  Last coitus one month ago         The following portions of the patient's history were reviewed and updated as appropriate: allergies, current medications, past medical history, past social history, past surgical history and problem list     Review of Systems   Constitutional: Negative  Gastrointestinal: Negative for abdominal pain, constipation, diarrhea, nausea and vomiting  Genitourinary: Positive for vaginal discharge  Negative for decreased urine volume, dyspareunia, dysuria, genital sores, menstrual problem, pelvic pain, urgency, vaginal bleeding and vaginal pain  Musculoskeletal: Negative for arthralgias and myalgias  Skin: Negative  Hematological: Negative for adenopathy  Psychiatric/Behavioral: Negative  All other systems reviewed and are negative  Objective:      /68 (BP Location: Right arm, Patient Position: Sitting, Cuff Size: Standard)   Wt 90 3 kg (199 lb)   LMP  (LMP Unknown)   BMI 35 25 kg/m²          Physical Exam  Vitals and nursing note reviewed  Constitutional:       Appearance: Normal appearance  She is well-developed  She is obese  Genitourinary:     General: Normal vulva  Exam position: Lithotomy position  Labia:         Right: No rash, tenderness, lesion or injury  Left: No rash, tenderness, lesion or injury  Urethra: No prolapse, urethral pain, urethral swelling or urethral lesion  Vagina: No signs of injury and foreign body  Vaginal discharge (copious white discharge) present  No erythema, tenderness or bleeding  Cervix: No cervical motion tenderness, discharge, friability or lesion  Uterus: Normal        Adnexa: Right adnexa normal and left adnexa normal         Right: No mass, tenderness or fullness  Left: No mass, tenderness or fullness  Rectum: No external hemorrhoid  Lymphadenopathy:      Lower Body: No right inguinal adenopathy  No left inguinal adenopathy  Skin:     General: Skin is warm and dry  Neurological:      Mental Status: She is alert and oriented to person, place, and time     Psychiatric:         Mood and Affect: Mood normal          Behavior: Behavior normal

## 2021-07-29 ENCOUNTER — OFFICE VISIT (OUTPATIENT)
Dept: OBGYN CLINIC | Facility: CLINIC | Age: 31
End: 2021-07-29
Payer: COMMERCIAL

## 2021-07-29 VITALS — WEIGHT: 199 LBS | DIASTOLIC BLOOD PRESSURE: 68 MMHG | SYSTOLIC BLOOD PRESSURE: 130 MMHG | BODY MASS INDEX: 35.25 KG/M2

## 2021-07-29 DIAGNOSIS — B96.89 BV (BACTERIAL VAGINOSIS): Primary | ICD-10-CM

## 2021-07-29 DIAGNOSIS — Z11.3 ROUTINE SCREENING FOR STI (SEXUALLY TRANSMITTED INFECTION): ICD-10-CM

## 2021-07-29 DIAGNOSIS — Z30.41 ENCOUNTER FOR SURVEILLANCE OF CONTRACEPTIVE PILLS: ICD-10-CM

## 2021-07-29 DIAGNOSIS — N76.0 BV (BACTERIAL VAGINOSIS): Primary | ICD-10-CM

## 2021-07-29 DIAGNOSIS — B37.3 VAGINAL YEAST INFECTION: ICD-10-CM

## 2021-07-29 LAB
BV WHIFF TEST VAG QL: ABNORMAL
CLUE CELLS SPEC QL WET PREP: ABNORMAL
LACTOBACILLUS (SPECIES) (HISTORICAL): ABNORMAL
PH SMN: ABNORMAL [PH]
SL AMB POCT WET MOUNT: ABNORMAL
T VAGINALIS VAG QL WET PREP: ABNORMAL
YEAST VAG QL WET PREP: ABNORMAL

## 2021-07-29 PROCEDURE — 87591 N.GONORRHOEAE DNA AMP PROB: CPT | Performed by: NURSE PRACTITIONER

## 2021-07-29 PROCEDURE — 87210 SMEAR WET MOUNT SALINE/INK: CPT | Performed by: NURSE PRACTITIONER

## 2021-07-29 PROCEDURE — 87491 CHLMYD TRACH DNA AMP PROBE: CPT | Performed by: NURSE PRACTITIONER

## 2021-07-29 PROCEDURE — 4004F PT TOBACCO SCREEN RCVD TLK: CPT | Performed by: NURSE PRACTITIONER

## 2021-07-29 PROCEDURE — 99214 OFFICE O/P EST MOD 30 MIN: CPT | Performed by: NURSE PRACTITIONER

## 2021-07-29 RX ORDER — NORETHINDRONE AND ETHINYL ESTRADIOL 0.4-0.035
KIT ORAL
Qty: 84 TABLET | Refills: 0 | Status: SHIPPED | OUTPATIENT
Start: 2021-07-29 | End: 2021-09-22

## 2021-07-29 RX ORDER — METRONIDAZOLE 500 MG/1
500 TABLET ORAL EVERY 12 HOURS SCHEDULED
Qty: 14 TABLET | Refills: 0 | Status: SHIPPED | OUTPATIENT
Start: 2021-07-29 | End: 2021-08-05

## 2021-07-29 NOTE — PATIENT INSTRUCTIONS
Bacterial vaginosis and yeast  noted on wet mount  Use your boric acid suppositories nightly intravaginally x 21 nights  Rx sent to requested pharmacy  Start metronidazole on day 7 of boric acid suppositories  No sex during treatment  Complete all medication as directed  Consider daily probiotic  Call with any recurrence of symptoms  GC/CT culture done as requested  Birth control refill sent to pharmacy on file x 2 months  Return to office in 9/21 for annual exam    Keep breast pain calendar

## 2021-07-30 LAB
C TRACH DNA SPEC QL NAA+PROBE: NEGATIVE
N GONORRHOEA DNA SPEC QL NAA+PROBE: NEGATIVE

## 2021-09-20 NOTE — PROGRESS NOTES
Patient's Lab: [facility]    Last Yearly: 9/23/20  Last Pap: 6/18/18 Due 2023  Results: -/-  BC: Edu Route  Are you taking consistently: Yes  LMP: Unknown  S/P HPV Series: Unknown  S/P Covid Shot: No  Sexually Active: Not Currently  STI Testing: Declined    HX: BV    Q's/Concerns: BV/Yeast recheck today

## 2021-09-22 NOTE — PROGRESS NOTES
Assessment/Plan:  Normal wet mount  Pap with high risk HPV testing every 5 years, if normal  Due 2023  Sexually transmitted infection testing as indicated  Negative GC/CT 7/21  Exercise most days of week-minimum of 150 minutes per week  Obtain appropriate diet and hydration  Calcium 1000mg + 600 vit D daily  Birth control as directed (ACHES reviewed)  Benefits, risks and alternatives discussed/reviewed  Aware she will have to discontinue her current birth control at age 26 if she is still smoking  Rx refilled  HPV 9 vaccine recommended through age 39  Check with your insurance for coverage  If covered, call office to schedule start of vaccine series  Annual mammogram(ordered)  based on family history  Recommend BRCA testing  Monthly breast self exam  Mario Maldonado 20 times twice daily  Use medication sent to pharmacy for vulvar itching as directed  Return to office in one year or sooner, if needed  Diagnoses and all orders for this visit:    Encntr for gyn exam (general) (routine) w abnormal findings    Genital herpes simplex, unspecified site  -     valACYclovir (VALTREX) 500 mg tablet; Take 1 tablet (500 mg total) by mouth daily    Encounter for surveillance of contraceptive pills  -     norethindrone-ethinyl estradiol (Joya ) 0 4-35 MG-MCG per tablet; TAKE 1 TABLET BY MOUTH EVERY DAY AVOID PLACEBO PILL FOR 2 PACKS, THEN TAKE DAILY INCLUDING PLACEBOS    Encounter for screening mammogram for breast cancer  -     Mammo screening bilateral w 3d & cad; Future    Family history of breast cancer in first degree relative  -     Mammo screening bilateral w 3d & cad; Future    Vulvar pruritus  -     nystatin-triamcinolone (MYCOLOG-II) ointment; Apply topically 2 (two) times a day  -     POCT wet mount    BMI 35 0-35 9,adult          Subjective:      Patient ID: Leo Schwab is a 33 y o  female  Leo Schwab is a 33 y o  female who is here today for her annual visit   Irregular menses on her balziva  (Also hx of tubal ligation)  Unknown LMP as she doesn't track  She believes she menstruates once annually  Alfonso Guaman is not currently sexually active  Last coitus 2 months ago  Declines STI testing  Last seen in office on 7/29/21 and treated for BV  She continues with vaginal symptoms of external vulvar itching  She also had a recent HSV outbreak which presents the same so she is unsure of the cause  Intermittent slight vaginal odor-none now  Negative GC/CT 7/21  Normal pap with negative HR HPV 6/18  Smoking a pack Q 2 5 days  The following portions of the patient's history were reviewed and updated as appropriate: allergies, current medications, past family history, past medical history, past social history, past surgical history and problem list     Review of Systems   Constitutional: Negative  Negative for activity change, appetite change, chills, diaphoresis, fatigue, fever and unexpected weight change  HENT: Negative for congestion, dental problem, sneezing, sore throat and trouble swallowing  Eyes: Negative for visual disturbance  Respiratory: Negative for chest tightness and shortness of breath  Cardiovascular: Negative for chest pain and leg swelling  Gastrointestinal: Negative for abdominal pain, constipation, diarrhea, nausea and vomiting  Genitourinary: Positive for vaginal discharge (rare intermittent)  Negative for difficulty urinating, dyspareunia, dysuria, frequency, hematuria, menstrual problem, pelvic pain, urgency, vaginal bleeding and vaginal pain  Vulvar pruritis   Musculoskeletal: Negative for back pain and neck pain  Skin: Negative  Allergic/Immunologic: Negative  Neurological: Negative for weakness and headaches  Hematological: Negative for adenopathy  Psychiatric/Behavioral: Negative            Objective:      /72 (BP Location: Left arm, Patient Position: Sitting, Cuff Size: Standard)   Ht 5' 3" (1 6 m)   Wt 89 8 kg (198 lb) LMP  (LMP Unknown)   BMI 35 07 kg/m²          Physical Exam  Vitals and nursing note reviewed  Constitutional:       Appearance: Normal appearance  She is well-developed  HENT:      Head: Normocephalic and atraumatic  Eyes:      General:         Right eye: No discharge  Left eye: No discharge  Neck:      Thyroid: No thyromegaly  Trachea: Trachea normal    Cardiovascular:      Rate and Rhythm: Normal rate and regular rhythm  Heart sounds: Normal heart sounds  Pulmonary:      Effort: Pulmonary effort is normal       Breath sounds: Normal breath sounds  Chest:      Breasts: Breasts are symmetrical          Right: Normal  No inverted nipple, mass, nipple discharge, skin change or tenderness  Left: Normal  No inverted nipple, mass, nipple discharge, skin change or tenderness  Abdominal:      Palpations: Abdomen is soft  Genitourinary:     Exam position: Lithotomy position  Labia:         Right: No rash, tenderness, lesion or injury  Left: No rash, tenderness, lesion or injury  Urethra: No prolapse, urethral pain, urethral swelling or urethral lesion  Vagina: Normal  No signs of injury and foreign body  No vaginal discharge, erythema, tenderness or bleeding  Cervix: Normal       Uterus: Normal        Adnexa: Right adnexa normal and left adnexa normal         Right: No mass, tenderness or fullness  Left: No mass, tenderness or fullness  Rectum: No external hemorrhoid  Comments: Hypopigmentation as noted on diagram  Musculoskeletal:         General: Normal range of motion  Cervical back: Normal range of motion and neck supple  Lymphadenopathy:      Head:      Right side of head: No submental, submandibular or tonsillar adenopathy  Left side of head: No submental, submandibular or tonsillar adenopathy  Cervical: No cervical adenopathy        Upper Body:      Right upper body: No supraclavicular or axillary adenopathy  Left upper body: No supraclavicular or axillary adenopathy  Lower Body: No right inguinal adenopathy  No left inguinal adenopathy  Skin:     General: Skin is warm and dry  Neurological:      Mental Status: She is alert and oriented to person, place, and time     Psychiatric:         Mood and Affect: Mood normal          Behavior: Behavior normal

## 2021-09-23 ENCOUNTER — ANNUAL EXAM (OUTPATIENT)
Dept: OBGYN CLINIC | Facility: CLINIC | Age: 31
End: 2021-09-23
Payer: COMMERCIAL

## 2021-09-23 VITALS
HEIGHT: 63 IN | WEIGHT: 198 LBS | DIASTOLIC BLOOD PRESSURE: 72 MMHG | SYSTOLIC BLOOD PRESSURE: 106 MMHG | BODY MASS INDEX: 35.08 KG/M2

## 2021-09-23 DIAGNOSIS — L29.2 VULVAR PRURITUS: ICD-10-CM

## 2021-09-23 DIAGNOSIS — Z30.41 ENCOUNTER FOR SURVEILLANCE OF CONTRACEPTIVE PILLS: ICD-10-CM

## 2021-09-23 DIAGNOSIS — Z80.3 FAMILY HISTORY OF BREAST CANCER IN FIRST DEGREE RELATIVE: ICD-10-CM

## 2021-09-23 DIAGNOSIS — Z01.411 ENCNTR FOR GYN EXAM (GENERAL) (ROUTINE) W ABNORMAL FINDINGS: Primary | ICD-10-CM

## 2021-09-23 DIAGNOSIS — A60.00 GENITAL HERPES SIMPLEX, UNSPECIFIED SITE: ICD-10-CM

## 2021-09-23 DIAGNOSIS — L29.2 VULVAR PRURITUS: Primary | ICD-10-CM

## 2021-09-23 DIAGNOSIS — Z12.31 ENCOUNTER FOR SCREENING MAMMOGRAM FOR BREAST CANCER: ICD-10-CM

## 2021-09-23 LAB
BV WHIFF TEST VAG QL: NORMAL
CLUE CELLS SPEC QL WET PREP: NORMAL
PH SMN: NORMAL [PH]
SL AMB POCT WET MOUNT: NORMAL
T VAGINALIS VAG QL WET PREP: NORMAL
YEAST VAG QL WET PREP: NORMAL

## 2021-09-23 PROCEDURE — 87210 SMEAR WET MOUNT SALINE/INK: CPT | Performed by: NURSE PRACTITIONER

## 2021-09-23 PROCEDURE — 99395 PREV VISIT EST AGE 18-39: CPT | Performed by: NURSE PRACTITIONER

## 2021-09-23 PROCEDURE — 0503F POSTPARTUM CARE VISIT: CPT | Performed by: NURSE PRACTITIONER

## 2021-09-23 RX ORDER — NORETHINDRONE AND ETHINYL ESTRADIOL 0.4-0.035
KIT ORAL
Qty: 84 TABLET | Refills: 3 | Status: SHIPPED | OUTPATIENT
Start: 2021-09-23

## 2021-09-23 RX ORDER — NYSTATIN AND TRIAMCINOLONE ACETONIDE 100000; 1 [USP'U]/G; MG/G
OINTMENT TOPICAL 2 TIMES DAILY
Qty: 30 G | Refills: 0 | Status: SHIPPED | OUTPATIENT
Start: 2021-09-23 | End: 2022-01-20 | Stop reason: ALTCHOICE

## 2021-09-23 RX ORDER — NYSTATIN AND TRIAMCINOLONE ACETONIDE 100000; 1 [USP'U]/G; MG/G
OINTMENT TOPICAL
Qty: 30 G | Refills: 0 | OUTPATIENT
Start: 2021-09-23

## 2021-09-23 RX ORDER — VALACYCLOVIR HYDROCHLORIDE 500 MG/1
500 TABLET, FILM COATED ORAL DAILY
Qty: 90 TABLET | Refills: 3 | Status: SHIPPED | OUTPATIENT
Start: 2021-09-23 | End: 2022-09-18

## 2021-09-23 RX ORDER — NYSTATIN 100000 U/G
OINTMENT TOPICAL 2 TIMES DAILY
Qty: 30 G | Refills: 0 | Status: SHIPPED | OUTPATIENT
Start: 2021-09-23 | End: 2022-01-20 | Stop reason: ALTCHOICE

## 2021-09-28 ENCOUNTER — TELEPHONE (OUTPATIENT)
Dept: OBGYN CLINIC | Facility: CLINIC | Age: 31
End: 2021-09-28

## 2021-11-02 ENCOUNTER — OFFICE VISIT (OUTPATIENT)
Dept: CARDIOLOGY CLINIC | Facility: CLINIC | Age: 31
End: 2021-11-02
Payer: COMMERCIAL

## 2021-11-02 VITALS
HEIGHT: 63 IN | BODY MASS INDEX: 34.73 KG/M2 | DIASTOLIC BLOOD PRESSURE: 85 MMHG | SYSTOLIC BLOOD PRESSURE: 117 MMHG | OXYGEN SATURATION: 99 % | WEIGHT: 196 LBS | HEART RATE: 74 BPM

## 2021-11-02 DIAGNOSIS — R07.89 OTHER CHEST PAIN: ICD-10-CM

## 2021-11-02 DIAGNOSIS — R07.89 OTHER CHEST PAIN: Primary | ICD-10-CM

## 2021-11-02 DIAGNOSIS — R00.2 PALPITATION: ICD-10-CM

## 2021-11-02 PROCEDURE — 99204 OFFICE O/P NEW MOD 45 MIN: CPT | Performed by: INTERNAL MEDICINE

## 2021-11-02 PROCEDURE — 93000 ELECTROCARDIOGRAM COMPLETE: CPT | Performed by: INTERNAL MEDICINE

## 2021-11-08 ENCOUNTER — APPOINTMENT (EMERGENCY)
Dept: CT IMAGING | Facility: HOSPITAL | Age: 31
End: 2021-11-08
Payer: COMMERCIAL

## 2021-11-08 ENCOUNTER — HOSPITAL ENCOUNTER (EMERGENCY)
Facility: HOSPITAL | Age: 31
Discharge: HOME | End: 2021-11-08
Attending: EMERGENCY MEDICINE
Payer: COMMERCIAL

## 2021-11-08 VITALS
RESPIRATION RATE: 16 BRPM | SYSTOLIC BLOOD PRESSURE: 112 MMHG | DIASTOLIC BLOOD PRESSURE: 73 MMHG | TEMPERATURE: 98.1 F | BODY MASS INDEX: 34.73 KG/M2 | HEIGHT: 63 IN | HEART RATE: 61 BPM | WEIGHT: 196 LBS | OXYGEN SATURATION: 100 %

## 2021-11-08 DIAGNOSIS — R10.84 GENERALIZED ABDOMINAL PAIN: Primary | ICD-10-CM

## 2021-11-08 LAB
ALBUMIN SERPL BCP-MCNC: 4 G/DL (ref 3.4–4.8)
ALP SERPL-CCNC: 44.6 U/L (ref 35–140)
ALT SERPL W P-5'-P-CCNC: 19 U/L (ref 5–54)
ANION GAP SERPL CALCULATED.3IONS-SCNC: 8 MMOL/L (ref 4–13)
AST SERPL W P-5'-P-CCNC: 18 U/L (ref 15–41)
BASOPHILS # BLD AUTO: 0.03 THOUSANDS/ΜL (ref 0–0.1)
BASOPHILS NFR BLD AUTO: 0 % (ref 0–1)
BILIRUB SERPL-MCNC: 0.33 MG/DL (ref 0.3–1.2)
BUN SERPL-MCNC: 12 MG/DL (ref 6–20)
CALCIUM SERPL-MCNC: 8.8 MG/DL (ref 8.4–10.2)
CHLORIDE SERPL-SCNC: 104 MMOL/L (ref 96–108)
CO2 SERPL-SCNC: 25 MMOL/L (ref 22–33)
CREAT SERPL-MCNC: 0.66 MG/DL (ref 0.4–1.1)
EOSINOPHIL # BLD AUTO: 0.14 THOUSAND/ΜL (ref 0–0.61)
EOSINOPHIL NFR BLD AUTO: 1 % (ref 0–6)
ERYTHROCYTE [DISTWIDTH] IN BLOOD BY AUTOMATED COUNT: 12.3 % (ref 11.6–15.1)
GFR SERPL CREATININE-BSD FRML MDRD: 116 ML/MIN/1.73SQ M
GLUCOSE SERPL-MCNC: 99 MG/DL (ref 65–140)
HCG SERPL QL: NEGATIVE
HCT VFR BLD AUTO: 41.8 % (ref 34.8–46.1)
HGB BLD-MCNC: 13.9 G/DL (ref 11.5–15.4)
IMM GRANULOCYTES # BLD AUTO: 0.02 THOUSAND/UL (ref 0–0.2)
IMM GRANULOCYTES NFR BLD AUTO: 0 % (ref 0–2)
LIPASE SERPL-CCNC: 18 U/L (ref 13–60)
LYMPHOCYTES # BLD AUTO: 3.18 THOUSANDS/ΜL (ref 0.6–4.47)
LYMPHOCYTES NFR BLD AUTO: 28 % (ref 14–44)
MAGNESIUM SERPL-MCNC: 1.8 MG/DL (ref 1.6–2.6)
MCH RBC QN AUTO: 29 PG (ref 26.8–34.3)
MCHC RBC AUTO-ENTMCNC: 33.3 G/DL (ref 31.4–37.4)
MCV RBC AUTO: 87 FL (ref 82–98)
MONOCYTES # BLD AUTO: 0.83 THOUSAND/ΜL (ref 0.17–1.22)
MONOCYTES NFR BLD AUTO: 7 % (ref 4–12)
NEUTROPHILS # BLD AUTO: 7.04 THOUSANDS/ΜL (ref 1.85–7.62)
NEUTS SEG NFR BLD AUTO: 64 % (ref 43–75)
PLATELET # BLD AUTO: 219 THOUSANDS/UL (ref 149–390)
PMV BLD AUTO: 11.4 FL (ref 8.9–12.7)
POTASSIUM SERPL-SCNC: 3.6 MMOL/L (ref 3.5–5)
PROT SERPL-MCNC: 7 G/DL (ref 6.4–8.3)
RBC # BLD AUTO: 4.79 MILLION/UL (ref 3.81–5.12)
SODIUM SERPL-SCNC: 137 MMOL/L (ref 133–145)
WBC # BLD AUTO: 11.24 THOUSAND/UL (ref 4.31–10.16)

## 2021-11-08 PROCEDURE — 84703 CHORIONIC GONADOTROPIN ASSAY: CPT | Performed by: EMERGENCY MEDICINE

## 2021-11-08 PROCEDURE — 85025 COMPLETE CBC W/AUTO DIFF WBC: CPT | Performed by: EMERGENCY MEDICINE

## 2021-11-08 PROCEDURE — 96374 THER/PROPH/DIAG INJ IV PUSH: CPT

## 2021-11-08 PROCEDURE — 83735 ASSAY OF MAGNESIUM: CPT | Performed by: EMERGENCY MEDICINE

## 2021-11-08 PROCEDURE — 80053 COMPREHEN METABOLIC PANEL: CPT | Performed by: EMERGENCY MEDICINE

## 2021-11-08 PROCEDURE — 74177 CT ABD & PELVIS W/CONTRAST: CPT

## 2021-11-08 PROCEDURE — 99284 EMERGENCY DEPT VISIT MOD MDM: CPT

## 2021-11-08 PROCEDURE — 99284 EMERGENCY DEPT VISIT MOD MDM: CPT | Performed by: EMERGENCY MEDICINE

## 2021-11-08 PROCEDURE — 83690 ASSAY OF LIPASE: CPT | Performed by: EMERGENCY MEDICINE

## 2021-11-08 PROCEDURE — 96361 HYDRATE IV INFUSION ADD-ON: CPT

## 2021-11-08 PROCEDURE — 36415 COLL VENOUS BLD VENIPUNCTURE: CPT | Performed by: EMERGENCY MEDICINE

## 2021-11-08 PROCEDURE — 96375 TX/PRO/DX INJ NEW DRUG ADDON: CPT

## 2021-11-08 RX ORDER — ONDANSETRON 4 MG/1
4 TABLET, ORALLY DISINTEGRATING ORAL EVERY 8 HOURS PRN
Qty: 5 TABLET | Refills: 0 | Status: SHIPPED | OUTPATIENT
Start: 2021-11-08 | End: 2022-05-19 | Stop reason: ALTCHOICE

## 2021-11-08 RX ORDER — ONDANSETRON 2 MG/ML
4 INJECTION INTRAMUSCULAR; INTRAVENOUS ONCE
Status: COMPLETED | OUTPATIENT
Start: 2021-11-08 | End: 2021-11-08

## 2021-11-08 RX ORDER — FAMOTIDINE 20 MG/1
20 TABLET, FILM COATED ORAL 2 TIMES DAILY
Qty: 20 TABLET | Refills: 0 | Status: SHIPPED | OUTPATIENT
Start: 2021-11-08 | End: 2022-04-21

## 2021-11-08 RX ADMIN — SODIUM CHLORIDE 1000 ML: 0.9 INJECTION, SOLUTION INTRAVENOUS at 04:03

## 2021-11-08 RX ADMIN — IOHEXOL 100 ML: 350 INJECTION, SOLUTION INTRAVENOUS at 05:12

## 2021-11-08 RX ADMIN — ONDANSETRON 4 MG: 2 INJECTION INTRAMUSCULAR; INTRAVENOUS at 04:03

## 2021-11-08 RX ADMIN — FAMOTIDINE 20 MG: 10 INJECTION, SOLUTION INTRAVENOUS at 04:03

## 2021-11-17 ENCOUNTER — HOSPITAL ENCOUNTER (OUTPATIENT)
Dept: NON INVASIVE DIAGNOSTICS | Facility: CLINIC | Age: 31
Discharge: HOME | End: 2021-11-17
Payer: COMMERCIAL

## 2021-11-17 DIAGNOSIS — R00.2 PALPITATION: ICD-10-CM

## 2021-11-17 PROCEDURE — 93225 XTRNL ECG REC<48 HRS REC: CPT

## 2021-11-17 PROCEDURE — 93226 XTRNL ECG REC<48 HR SCAN A/R: CPT

## 2021-11-23 PROCEDURE — 93227 XTRNL ECG REC<48 HR R&I: CPT | Performed by: INTERNAL MEDICINE

## 2022-01-05 ENCOUNTER — TELEPHONE (OUTPATIENT)
Dept: CARDIOLOGY CLINIC | Facility: CLINIC | Age: 32
End: 2022-01-05

## 2022-04-07 ENCOUNTER — TELEPHONE (OUTPATIENT)
Dept: CARDIOLOGY CLINIC | Facility: CLINIC | Age: 32
End: 2022-04-07

## 2022-04-07 NOTE — TELEPHONE ENCOUNTER
Spoke with pt re: coming into office for a follow up  She asked if there was a reason for her to since [place] the [place]  I let her know she was suppose to come back in after holter monitor  Pt would like to speak with someone re: her results

## 2022-04-11 NOTE — TELEPHONE ENCOUNTER
Called pt and as per Dr Jl Miller if pt has no new complaints and feels good no need for f/u appt  Pt stated she received her normal results for holter and has no new complaints  No need for F/u  Pt satisfied

## 2022-04-21 ENCOUNTER — OFFICE VISIT (OUTPATIENT)
Dept: URGENT CARE | Facility: CLINIC | Age: 32
End: 2022-04-21
Payer: COMMERCIAL

## 2022-04-21 ENCOUNTER — APPOINTMENT (OUTPATIENT)
Dept: RADIOLOGY | Facility: CLINIC | Age: 32
End: 2022-04-21
Payer: COMMERCIAL

## 2022-04-21 VITALS
BODY MASS INDEX: 35.97 KG/M2 | WEIGHT: 203 LBS | HEART RATE: 81 BPM | TEMPERATURE: 97.5 F | OXYGEN SATURATION: 100 % | DIASTOLIC BLOOD PRESSURE: 78 MMHG | HEIGHT: 63 IN | RESPIRATION RATE: 16 BRPM | SYSTOLIC BLOOD PRESSURE: 105 MMHG

## 2022-04-21 DIAGNOSIS — S69.91XA HAND INJURY, RIGHT, INITIAL ENCOUNTER: ICD-10-CM

## 2022-04-21 DIAGNOSIS — S69.91XA HAND INJURY, RIGHT, INITIAL ENCOUNTER: Primary | ICD-10-CM

## 2022-04-21 PROCEDURE — 29125 APPL SHORT ARM SPLINT STATIC: CPT | Performed by: NURSE PRACTITIONER

## 2022-04-21 PROCEDURE — 73130 X-RAY EXAM OF HAND: CPT

## 2022-04-21 PROCEDURE — 99213 OFFICE O/P EST LOW 20 MIN: CPT | Performed by: NURSE PRACTITIONER

## 2022-04-21 NOTE — PATIENT INSTRUCTIONS
--Initial read of x-ray negative for fracture  Will call with final results when obtained (anticipate 1-12 hours)  --Elevate, ice 3-4 times a day for the next 2-3 days or until swelling decreased, then can ice as tolerated  --Motrin/Advil every 6 hours as needed for pain  Alternative is OTC Voltaren gel     --Wear finger splint (until seen by ortho)  --Follow-up with ortho hand specialist
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
## 2022-05-20 ENCOUNTER — OFFICE VISIT (OUTPATIENT)
Dept: FAMILY MEDICINE CLINIC | Facility: CLINIC | Age: 32
End: 2022-05-20
Payer: COMMERCIAL

## 2022-05-20 VITALS
WEIGHT: 201 LBS | SYSTOLIC BLOOD PRESSURE: 106 MMHG | HEART RATE: 61 BPM | OXYGEN SATURATION: 99 % | DIASTOLIC BLOOD PRESSURE: 76 MMHG | BODY MASS INDEX: 35.61 KG/M2 | HEIGHT: 63 IN | RESPIRATION RATE: 16 BRPM

## 2022-05-20 DIAGNOSIS — R05.9 COUGH: ICD-10-CM

## 2022-05-20 DIAGNOSIS — G43.909 MIGRAINE WITHOUT STATUS MIGRAINOSUS, NOT INTRACTABLE, UNSPECIFIED MIGRAINE TYPE: ICD-10-CM

## 2022-05-20 DIAGNOSIS — E55.9 VITAMIN D DEFICIENCY: ICD-10-CM

## 2022-05-20 DIAGNOSIS — J01.90 ACUTE NON-RECURRENT SINUSITIS, UNSPECIFIED LOCATION: ICD-10-CM

## 2022-05-20 DIAGNOSIS — Z79.899 OTHER LONG TERM (CURRENT) DRUG THERAPY: ICD-10-CM

## 2022-05-20 DIAGNOSIS — R53.83 FATIGUE, UNSPECIFIED TYPE: Primary | ICD-10-CM

## 2022-05-20 DIAGNOSIS — R00.2 PALPITATION: ICD-10-CM

## 2022-05-20 DIAGNOSIS — L40.9 PSORIASIS: ICD-10-CM

## 2022-05-20 DIAGNOSIS — E66.01 CLASS 2 SEVERE OBESITY DUE TO EXCESS CALORIES WITH SERIOUS COMORBIDITY AND BODY MASS INDEX (BMI) OF 35.0 TO 35.9 IN ADULT (HCC): ICD-10-CM

## 2022-05-20 DIAGNOSIS — F17.200 SMOKER: ICD-10-CM

## 2022-05-20 DIAGNOSIS — E53.8 B12 DEFICIENCY: ICD-10-CM

## 2022-05-20 DIAGNOSIS — E61.1 IRON DEFICIENCY: ICD-10-CM

## 2022-05-20 PROCEDURE — 99214 OFFICE O/P EST MOD 30 MIN: CPT | Performed by: FAMILY MEDICINE

## 2022-05-20 RX ORDER — FLUTICASONE PROPIONATE AND SALMETEROL XINAFOATE 115; 21 UG/1; UG/1
2 AEROSOL, METERED RESPIRATORY (INHALATION) 2 TIMES DAILY
Qty: 36 G | Refills: 0 | Status: SHIPPED | OUTPATIENT
Start: 2022-05-20

## 2022-05-20 RX ORDER — BETAMETHASONE DIPROPIONATE 0.5 MG/G
CREAM TOPICAL 2 TIMES DAILY
Qty: 45 G | Refills: 1 | Status: SHIPPED | OUTPATIENT
Start: 2022-05-20

## 2022-05-20 RX ORDER — RIZATRIPTAN BENZOATE 5 MG/1
5 TABLET, ORALLY DISINTEGRATING ORAL ONCE AS NEEDED
Qty: 9 TABLET | Refills: 0 | Status: SHIPPED | OUTPATIENT
Start: 2022-05-20

## 2022-05-20 RX ORDER — AMOXICILLIN AND CLAVULANATE POTASSIUM 875; 125 MG/1; MG/1
1 TABLET, FILM COATED ORAL EVERY 12 HOURS SCHEDULED
Qty: 20 TABLET | Refills: 0 | Status: SHIPPED | OUTPATIENT
Start: 2022-05-20 | End: 2022-05-30

## 2022-05-20 RX ORDER — CALCIPOTRIENE 50 UG/G
OINTMENT TOPICAL 2 TIMES DAILY
Qty: 60 G | Refills: 1 | Status: SHIPPED | OUTPATIENT
Start: 2022-05-20
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
## 2022-05-25 ENCOUNTER — APPOINTMENT (OUTPATIENT)
Dept: LAB | Facility: CLINIC | Age: 32
End: 2022-05-25
Payer: COMMERCIAL

## 2022-05-25 DIAGNOSIS — R00.2 PALPITATION: ICD-10-CM

## 2022-05-25 DIAGNOSIS — E61.1 IRON DEFICIENCY: ICD-10-CM

## 2022-05-25 DIAGNOSIS — E53.8 B12 DEFICIENCY: ICD-10-CM

## 2022-05-25 DIAGNOSIS — R53.83 FATIGUE, UNSPECIFIED TYPE: ICD-10-CM

## 2022-05-25 DIAGNOSIS — Z79.899 OTHER LONG TERM (CURRENT) DRUG THERAPY: ICD-10-CM

## 2022-05-25 DIAGNOSIS — E55.9 VITAMIN D DEFICIENCY: ICD-10-CM

## 2022-05-25 LAB
25(OH)D3 SERPL-MCNC: 19.2 NG/ML (ref 30–100)
ALBUMIN SERPL BCP-MCNC: 3.5 G/DL (ref 3.5–5)
ALP SERPL-CCNC: 57 U/L (ref 46–116)
ALT SERPL W P-5'-P-CCNC: 25 U/L (ref 12–78)
ANION GAP SERPL CALCULATED.3IONS-SCNC: 3 MMOL/L (ref 4–13)
AST SERPL W P-5'-P-CCNC: 17 U/L (ref 5–45)
BASOPHILS # BLD AUTO: 0.03 THOUSANDS/ΜL (ref 0–0.1)
BASOPHILS NFR BLD AUTO: 0 % (ref 0–1)
BILIRUB SERPL-MCNC: 0.61 MG/DL (ref 0.2–1)
BUN SERPL-MCNC: 10 MG/DL (ref 5–25)
CALCIUM SERPL-MCNC: 9.2 MG/DL (ref 8.3–10.1)
CHLORIDE SERPL-SCNC: 109 MMOL/L (ref 100–108)
CHOLEST SERPL-MCNC: 153 MG/DL
CO2 SERPL-SCNC: 27 MMOL/L (ref 21–32)
CREAT SERPL-MCNC: 0.67 MG/DL (ref 0.6–1.3)
EOSINOPHIL # BLD AUTO: 0.13 THOUSAND/ΜL (ref 0–0.61)
EOSINOPHIL NFR BLD AUTO: 2 % (ref 0–6)
ERYTHROCYTE [DISTWIDTH] IN BLOOD BY AUTOMATED COUNT: 12.5 % (ref 11.6–15.1)
FERRITIN SERPL-MCNC: 28 NG/ML (ref 8–388)
GFR SERPL CREATININE-BSD FRML MDRD: 115 ML/MIN/1.73SQ M
GLUCOSE P FAST SERPL-MCNC: 101 MG/DL (ref 65–99)
HCT VFR BLD AUTO: 44.5 % (ref 34.8–46.1)
HDLC SERPL-MCNC: 45 MG/DL
HGB BLD-MCNC: 14.8 G/DL (ref 11.5–15.4)
IMM GRANULOCYTES # BLD AUTO: 0.03 THOUSAND/UL (ref 0–0.2)
IMM GRANULOCYTES NFR BLD AUTO: 0 % (ref 0–2)
IRON SATN MFR SERPL: 11 % (ref 15–50)
IRON SERPL-MCNC: 44 UG/DL (ref 50–170)
LDLC SERPL CALC-MCNC: 95 MG/DL (ref 0–100)
LYMPHOCYTES # BLD AUTO: 2.32 THOUSANDS/ΜL (ref 0.6–4.47)
LYMPHOCYTES NFR BLD AUTO: 28 % (ref 14–44)
MCH RBC QN AUTO: 29.2 PG (ref 26.8–34.3)
MCHC RBC AUTO-ENTMCNC: 33.3 G/DL (ref 31.4–37.4)
MCV RBC AUTO: 88 FL (ref 82–98)
MONOCYTES # BLD AUTO: 0.55 THOUSAND/ΜL (ref 0.17–1.22)
MONOCYTES NFR BLD AUTO: 7 % (ref 4–12)
NEUTROPHILS # BLD AUTO: 5.29 THOUSANDS/ΜL (ref 1.85–7.62)
NEUTS SEG NFR BLD AUTO: 63 % (ref 43–75)
NRBC BLD AUTO-RTO: 0 /100 WBCS
PLATELET # BLD AUTO: 240 THOUSANDS/UL (ref 149–390)
PMV BLD AUTO: 11.8 FL (ref 8.9–12.7)
POTASSIUM SERPL-SCNC: 4.6 MMOL/L (ref 3.5–5.3)
PROT SERPL-MCNC: 7.2 G/DL (ref 6.4–8.2)
RBC # BLD AUTO: 5.06 MILLION/UL (ref 3.81–5.12)
SODIUM SERPL-SCNC: 139 MMOL/L (ref 136–145)
TIBC SERPL-MCNC: 404 UG/DL (ref 250–450)
TRIGL SERPL-MCNC: 64 MG/DL
TSH SERPL DL<=0.05 MIU/L-ACNC: 1.71 UIU/ML (ref 0.45–4.5)
VIT B12 SERPL-MCNC: 513 PG/ML (ref 100–900)
WBC # BLD AUTO: 8.35 THOUSAND/UL (ref 4.31–10.16)

## 2022-05-25 PROCEDURE — 85025 COMPLETE CBC W/AUTO DIFF WBC: CPT

## 2022-05-25 PROCEDURE — 82306 VITAMIN D 25 HYDROXY: CPT

## 2022-05-25 PROCEDURE — 80053 COMPREHEN METABOLIC PANEL: CPT

## 2022-05-25 PROCEDURE — 83550 IRON BINDING TEST: CPT

## 2022-05-25 PROCEDURE — 80061 LIPID PANEL: CPT

## 2022-05-25 PROCEDURE — 84443 ASSAY THYROID STIM HORMONE: CPT

## 2022-05-25 PROCEDURE — 82728 ASSAY OF FERRITIN: CPT

## 2022-05-25 PROCEDURE — 83036 HEMOGLOBIN GLYCOSYLATED A1C: CPT

## 2022-05-25 PROCEDURE — 83540 ASSAY OF IRON: CPT

## 2022-05-25 PROCEDURE — 82607 VITAMIN B-12: CPT

## 2022-05-25 PROCEDURE — 36415 COLL VENOUS BLD VENIPUNCTURE: CPT

## 2022-05-26 LAB
EST. AVERAGE GLUCOSE BLD GHB EST-MCNC: 91 MG/DL
HBA1C MFR BLD: 4.8 %

## 2022-07-19 ENCOUNTER — VBI (OUTPATIENT)
Dept: ADMINISTRATIVE | Facility: OTHER | Age: 32
End: 2022-07-19

## 2023-08-29 ENCOUNTER — HOSPITAL ENCOUNTER (EMERGENCY)
Facility: HOSPITAL | Age: 33
Discharge: HOME | End: 2023-08-29
Attending: EMERGENCY MEDICINE

## 2023-08-29 VITALS
TEMPERATURE: 98 F | DIASTOLIC BLOOD PRESSURE: 70 MMHG | RESPIRATION RATE: 12 BRPM | HEART RATE: 58 BPM | WEIGHT: 217 LBS | BODY MASS INDEX: 38.44 KG/M2 | OXYGEN SATURATION: 98 % | SYSTOLIC BLOOD PRESSURE: 126 MMHG

## 2023-08-29 DIAGNOSIS — L03.213 PRESEPTAL CELLULITIS OF RIGHT LOWER EYELID: Primary | ICD-10-CM

## 2023-08-29 PROCEDURE — 99283 EMERGENCY DEPT VISIT LOW MDM: CPT

## 2023-08-29 PROCEDURE — 99284 EMERGENCY DEPT VISIT MOD MDM: CPT | Performed by: EMERGENCY MEDICINE

## 2023-08-29 RX ORDER — AMOXICILLIN AND CLAVULANATE POTASSIUM 875; 125 MG/1; MG/1
1 TABLET, FILM COATED ORAL EVERY 12 HOURS
Qty: 14 TABLET | Refills: 0 | Status: SHIPPED | OUTPATIENT
Start: 2023-08-29 | End: 2023-09-05

## 2023-08-29 RX ORDER — AMOXICILLIN AND CLAVULANATE POTASSIUM 875; 125 MG/1; MG/1
1 TABLET, FILM COATED ORAL ONCE
Status: COMPLETED | OUTPATIENT
Start: 2023-08-29 | End: 2023-08-29

## 2023-08-29 RX ADMIN — AMOXICILLIN AND CLAVULANATE POTASSIUM 1 TABLET: 875; 125 TABLET, FILM COATED ORAL at 07:52

## 2023-08-29 NOTE — Clinical Note
Peggy Lopez was seen and treated in our emergency department on 8/29/2023. Diagnosis: eye infection    Khalida Nice  may return to work on return date. She may return on this date: 08/30/2023         If you have any questions or concerns, please don't hesitate to call.       Laura Hilliard MD    ______________________________           _______________          _______________  Hospital Representative                              Date                                Time

## 2023-08-29 NOTE — ED PROVIDER NOTES
History  Chief Complaint   Patient presents with   • Eye Problem     Right eye conjunctival redness and swelling x2 days. Clear Drainage beginning last evening     75-year-old female presents with 2 days of pain and swelling below her right eye. No inciting trauma. Patient denies foreign body sensation or altered vision. No history of similar. No fevers or chills. No URI symptoms. Prior to Admission Medications   Prescriptions Last Dose Informant Patient Reported? Taking? betamethasone dipropionate (DIPROSONE) 0.05 % cream   No No   Sig: Apply topically 2 (two) times a day   calcipotriene (DOVONOX) 0.005 % ointment   No No   Sig: Apply topically 2 (two) times a day   ergocalciferol (VITAMIN D2) 50,000 units  Self No No   Sig: Take 1 capsule (50,000 Units total) by mouth once a week Take as one dose   fluticasone-salmeterol (Advair HFA) 115-21 MCG/ACT inhaler   No No   Sig: Inhale 2 puffs  in the morning and 2 puffs in the evening. Rinse mouth after use. .   norethindrone-ethinyl estradiol (Balziva) 0.4-35 MG-MCG per tablet   No No   Sig: TAKE 1 TABLET BY MOUTH EVERY DAY AVOID PLACEBO PILL FOR 2 PACKS, THEN TAKE DAILY INCLUDING PLACEBOS   rizatriptan (Maxalt-MLT) 5 MG disintegrating tablet   No No   Sig: Take 1 tablet (5 mg total) by mouth once as needed for migraine for up to 1 dose May repeat in 2 hours if needed   valACYclovir (VALTREX) 500 mg tablet   No No   Sig: Take 1 tablet (500 mg total) by mouth daily      Facility-Administered Medications: None       Past Medical History:   Diagnosis Date   • Anxiety    • Blood type A+    • Depression    • Herpes    • High blood pressure    • High cholesterol    • Hypertension    • Varicella        Past Surgical History:   Procedure Laterality Date   • BIOPSY CORE NEEDLE     • INDUCED       by D&C    • TUBAL LIGATION     • US GUIDED BREAST BIOPSY RIGHT COMPLETE Right 2019   • WISDOM TOOTH EXTRACTION         Family History   Problem Relation Age of Onset   • Breast cancer Mother 29   • Lung cancer Mother 29   • Hypertension Father    • Hyperlipidemia Father    • No Known Problems Sister    • No Known Problems Sister    • No Known Problems Brother    • No Known Problems Maternal Grandmother    • Stroke Maternal Grandfather    • Heart attack Maternal Grandfather    • Heart failure Maternal Grandfather    • Hypertension Maternal Grandfather    • Aortic stenosis Maternal Grandfather    • No Known Problems Paternal Grandmother    • Heart failure Paternal Grandfather    • No Known Problems Daughter    • No Known Problems Daughter    • Asthma Son    • ADD / ADHD Son    • Cancer Paternal Aunt    • Breast cancer Paternal Aunt         age dx unknown   • No Known Problems Paternal Aunt    • No Known Problems Paternal Aunt    • Breast cancer Other 36   • Asthma Other      I have reviewed and agree with the history as documented. E-Cigarette/Vaping   • E-Cigarette Use Former User      E-Cigarette/Vaping Substances   • Nicotine No    • THC Yes    • CBD Yes    • Flavoring No    • Other No    • Unknown No      Social History     Tobacco Use   • Smoking status: Some Days     Packs/day: 0.50     Years: 15.00     Total pack years: 7.50     Types: Cigarettes   • Smokeless tobacco: Never   • Tobacco comments:     "trying to quit" Jan 2021   Vaping Use   • Vaping Use: Former   • Substances: THC, CBD   Substance Use Topics   • Alcohol use: Yes     Comment: occas. • Drug use: Yes     Types: Marijuana     Comment: Medical card       Review of Systems   All other systems reviewed and are negative. Physical Exam  Physical Exam  Constitutional:       General: She is not in acute distress. Appearance: Normal appearance. HENT:      Head: Normocephalic and atraumatic.       Right Ear: External ear normal.      Left Ear: External ear normal.      Nose: Nose normal.      Mouth/Throat:      Mouth: Mucous membranes are moist.   Eyes:      General:         Right eye: No discharge. Left eye: No discharge. Extraocular Movements: Extraocular movements intact. Conjunctiva/sclera: Conjunctivae normal.      Pupils: Pupils are equal, round, and reactive to light. Comments: Mild erythema tenderness and swelling below the right eye involving the lower eyelid without focal lesion, no pain with extraocular movements, no proptosis   Cardiovascular:      Rate and Rhythm: Normal rate. Pulmonary:      Effort: Pulmonary effort is normal.   Musculoskeletal:      Cervical back: Normal range of motion. Neurological:      Mental Status: She is alert. Vital Signs  ED Triage Vitals [08/29/23 0718]   Temperature Pulse Respirations Blood Pressure SpO2   98 °F (36.7 °C) 58 12 126/70 98 %      Temp Source Heart Rate Source Patient Position - Orthostatic VS BP Location FiO2 (%)   Oral Monitor Sitting Right arm --      Pain Score       9           Vitals:    08/29/23 0718   BP: 126/70   Pulse: 58   Patient Position - Orthostatic VS: Sitting         Visual Acuity  Visual Acuity    Flowsheet Row Most Recent Value   Visual acuity R eye is 20/25   Visual acuity Left eye is 20/20   Visual acuity in both eyes is 20/20   Wearing corrective eyewear/lenses? Yes          ED Medications  Medications   amoxicillin-clavulanate (AUGMENTIN) 875-125 mg per tablet 1 tablet (1 tablet Oral Given 8/29/23 0752)       Diagnostic Studies  Results Reviewed     None                 No orders to display              Procedures  Procedures         ED Course       76-year-old female presenting with eye complaint. Exam is most consistent with preseptal cellulitis of the right lower lid. No conjunctival involvement. Exam is not consistent with orbital cellulitis or other more serious infection of the eye. Normal visual acuity bilaterally. We will treat empirically with Augmentin. First dose administered in the ED.   Return precautions given for clinical worsening or failure to improve. SBIRT 20yo+    Flowsheet Row Most Recent Value   Initial Alcohol Screen: US AUDIT-C     1. How often do you have a drink containing alcohol? 0 Filed at: 08/29/2023 0721   2. How many drinks containing alcohol do you have on a typical day you are drinking? 0 Filed at: 08/29/2023 0721   3a. Male UNDER 65: How often do you have five or more drinks on one occasion? 0 Filed at: 08/29/2023 0721   3b. FEMALE Any Age, or MALE 65+: How often do you have 4 or more drinks on one occassion? 0 Filed at: 08/29/2023 2157   Audit-C Score 0 Filed at: 08/29/2023 2908   CARINA: How many times in the past year have you. .. Used an illegal drug or used a prescription medication for non-medical reasons? Never Filed at: 08/29/2023 8553                    MDM    Disposition  Final diagnoses:   Preseptal cellulitis of right lower eyelid     Time reflects when diagnosis was documented in both MDM as applicable and the Disposition within this note     Time User Action Codes Description Comment    8/29/2023  7:42 AM Clemetine Nim Add [N64.064] Preseptal cellulitis of right lower eyelid       ED Disposition     ED Disposition   Discharge    Condition   Stable    Date/Time   Tue Aug 29, 2023 [facility] discharge to home/self care.                Follow-up Information     Follow up With Specialties Details Why Contact Info    Jared Valle MD Ophthalmology Schedule an appointment as soon as possible for a visit  As needed [address] [place] [address]  [phone]            Discharge Medication List as of 8/29/2023  7:46 AM      START taking these medications    Details   amoxicillin-clavulanate (AUGMENTIN) 875-125 mg per tablet Take 1 tablet by mouth every 12 (twelve) hours for 7 days, Starting Tue 8/29/2023, Until Tue 9/5/2023, Normal         CONTINUE these medications which have NOT CHANGED    Details   betamethasone dipropionate (DIPROSONE) 0.05 % cream Apply topically 2 (two) times a day, Starting Fri 5/20/2022, Normal      calcipotriene (DOVONOX) 0.005 % ointment Apply topically 2 (two) times a day, Starting Fri 5/20/2022, Normal      ergocalciferol (VITAMIN D2) 50,000 units Take 1 capsule (50,000 Units total) by mouth once a week Take as one dose, Starting Thu 5/27/2021, Normal      fluticasone-salmeterol (Advair HFA) 115-21 MCG/ACT inhaler Inhale 2 puffs  in the morning and 2 puffs in the evening. Rinse mouth after use. ., Starting Fri 5/20/2022, Normal      norethindrone-ethinyl estradiol (Balziva) 0.4-35 MG-MCG per tablet TAKE 1 TABLET BY MOUTH EVERY DAY AVOID PLACEBO PILL FOR 2 PACKS, THEN TAKE DAILY INCLUDING PLACEBOS, Normal      rizatriptan (Maxalt-MLT) 5 MG disintegrating tablet Take 1 tablet (5 mg total) by mouth once as needed for migraine for up to 1 dose May repeat in 2 hours if needed, Starting Fri 5/20/2022, Normal      valACYclovir (VALTREX) 500 mg tablet Take 1 tablet (500 mg total) by mouth daily, Starting Thu 9/23/2021, Until Sun 9/18/2022, Normal             No discharge procedures on file.     PDMP Review     None          ED Provider  Electronically Signed by           Sandeep Salazar MD  08/29/23 8385

## 2023-11-09 ENCOUNTER — LAB (OUTPATIENT)
Age: 33
End: 2023-11-09

## 2023-11-09 ENCOUNTER — APPOINTMENT (OUTPATIENT)
Age: 33
End: 2023-11-09

## 2023-11-09 DIAGNOSIS — Z00.00 ROUTINE GENERAL MEDICAL EXAMINATION AT A HEALTH CARE FACILITY: ICD-10-CM

## 2023-11-09 PROCEDURE — 86480 TB TEST CELL IMMUN MEASURE: CPT

## 2023-11-10 LAB
GAMMA INTERFERON BACKGROUND BLD IA-ACNC: 0 IU/ML
M TB IFN-G BLD-IMP: NEGATIVE
M TB IFN-G CD4+ BCKGRND COR BLD-ACNC: 0 IU/ML
M TB IFN-G CD4+ BCKGRND COR BLD-ACNC: 0 IU/ML
MITOGEN IGNF BCKGRD COR BLD-ACNC: 10 IU/ML

## 2023-12-27 ENCOUNTER — HOSPITAL ENCOUNTER (EMERGENCY)
Facility: HOSPITAL | Age: 33
Discharge: HOME | End: 2023-12-27
Attending: EMERGENCY MEDICINE

## 2023-12-27 VITALS
RESPIRATION RATE: 16 BRPM | HEART RATE: 81 BPM | WEIGHT: 217 LBS | DIASTOLIC BLOOD PRESSURE: 71 MMHG | BODY MASS INDEX: 38.44 KG/M2 | SYSTOLIC BLOOD PRESSURE: 130 MMHG | TEMPERATURE: 98 F | OXYGEN SATURATION: 100 %

## 2023-12-27 DIAGNOSIS — K08.89 PAIN, DENTAL: Primary | ICD-10-CM

## 2023-12-27 PROCEDURE — 99282 EMERGENCY DEPT VISIT SF MDM: CPT

## 2023-12-27 PROCEDURE — 99284 EMERGENCY DEPT VISIT MOD MDM: CPT | Performed by: EMERGENCY MEDICINE

## 2023-12-27 RX ORDER — AMOXICILLIN AND CLAVULANATE POTASSIUM 875; 125 MG/1; MG/1
1 TABLET, FILM COATED ORAL EVERY 12 HOURS
Qty: 14 TABLET | Refills: 0 | Status: SHIPPED | OUTPATIENT
Start: 2023-12-27 | End: 2024-01-03

## 2023-12-27 RX ORDER — AMOXICILLIN AND CLAVULANATE POTASSIUM 875; 125 MG/1; MG/1
1 TABLET, FILM COATED ORAL ONCE
Status: COMPLETED | OUTPATIENT
Start: 2023-12-27 | End: 2023-12-27

## 2023-12-27 RX ADMIN — AMOXICILLIN AND CLAVULANATE POTASSIUM 1 TABLET: 875; 125 TABLET, FILM COATED ORAL at 18:54

## 2023-12-30 NOTE — ED PROVIDER NOTES
History  Chief Complaint   Patient presents with    Dental Pain     Reports L lower dental pain x2 weeks. Doesn't have a dentist appt scheduled yet.      33-year-old female presents with 2 weeks of worsening left lower dental pain and tooth sensitivity.  No associated swelling.  Patient does not currently have a dentist.        Prior to Admission Medications   Prescriptions Last Dose Informant Patient Reported? Taking?   betamethasone dipropionate (DIPROSONE) 0.05 % cream   No No   Sig: Apply topically 2 (two) times a day   calcipotriene (DOVONOX) 0.005 % ointment   No No   Sig: Apply topically 2 (two) times a day   ergocalciferol (VITAMIN D2) 50,000 units  Self No No   Sig: Take 1 capsule (50,000 Units total) by mouth once a week Take as one dose   fluticasone-salmeterol (Advair HFA) 115-21 MCG/ACT inhaler   No No   Sig: Inhale 2 puffs  in the morning and 2 puffs in the evening. Rinse mouth after use..   norethindrone-ethinyl estradiol (Balziva) 0.4-35 MG-MCG per tablet   No No   Sig: TAKE 1 TABLET BY MOUTH EVERY DAY AVOID PLACEBO PILL FOR 2 PACKS, THEN TAKE DAILY INCLUDING PLACEBOS   rizatriptan (Maxalt-MLT) 5 MG disintegrating tablet   No No   Sig: Take 1 tablet (5 mg total) by mouth once as needed for migraine for up to 1 dose May repeat in 2 hours if needed   valACYclovir (VALTREX) 500 mg tablet   No No   Sig: Take 1 tablet (500 mg total) by mouth daily      Facility-Administered Medications: None       Past Medical History:   Diagnosis Date    Anxiety     Blood type A+     Depression     Herpes     High blood pressure     High cholesterol     Hypertension     Varicella        Past Surgical History:   Procedure Laterality Date    BIOPSY CORE NEEDLE      INDUCED   2010    by [facility]     TUBAL LIGATION      US GUIDED BREAST BIOPSY RIGHT COMPLETE Right 2019    WISDOM TOOTH EXTRACTION         Family History   Problem Relation Age of Onset    Breast cancer Mother 28    Lung cancer Mother 28    Hypertension  "Father     Hyperlipidemia Father     No Known Problems Sister     No Known Problems Sister     No Known Problems Brother     No Known Problems Maternal Grandmother     Stroke Maternal Grandfather     Heart attack Maternal Grandfather     Heart failure Maternal Grandfather     Hypertension Maternal Grandfather     Aortic stenosis Maternal Grandfather     No Known Problems Paternal Grandmother     Heart failure Paternal Grandfather     No Known Problems Daughter     No Known Problems Daughter     Asthma Son     ADD / ADHD Son     Cancer Paternal Aunt     Breast cancer Paternal Aunt         age dx unknown    No Known Problems Paternal Aunt     No Known Problems Paternal Aunt     Breast cancer Other 40    Asthma Other      I have reviewed and agree with the history as documented.    E-Cigarette/Vaping    E-Cigarette Use Former User      E-Cigarette/Vaping Substances    Nicotine No     THC Yes     CBD Yes     Flavoring No     Other No     Unknown No      Social History     Tobacco Use    Smoking status: Some Days     Current packs/day: 0.50     Average packs/day: 0.5 packs/day for 15.0 years (7.5 ttl pk-yrs)     Types: Cigarettes    Smokeless tobacco: Never    Tobacco comments:     \"trying to quit\" Jan 2021   Vaping Use    Vaping status: Former    Substances: THC, CBD   Substance Use Topics    Alcohol use: Yes     Comment: occas.    Drug use: Yes     Types: Marijuana     Comment: Medical card       Review of Systems   All other systems reviewed and are negative.      Physical Exam  Physical Exam  Constitutional:       General: She is not in acute distress.     Appearance: Normal appearance.   HENT:      Head: Normocephalic.      Nose: Nose normal.      Mouth/Throat:      Mouth: Mucous membranes are moist.      Comments: Normal dentition, no gum swelling or erythema or jaw swelling.  Cardiovascular:      Rate and Rhythm: Normal rate.   Pulmonary:      Effort: Pulmonary effort is normal.   Skin:     General: Skin is warm " and dry.   Neurological:      Mental Status: She is alert.   Psychiatric:         Mood and Affect: Mood normal.         Behavior: Behavior normal.         Vital Signs  ED Triage Vitals   Temperature Pulse Respirations Blood Pressure SpO2   12/27/23 1818 12/27/23 1817 12/27/23 1817 12/27/23 1817 12/27/23 1817   98 °F (36.7 °C) 81 16 130/71 100 %      Temp src Heart Rate Source Patient Position - Orthostatic VS BP Location FiO2 (%)   -- -- -- 12/27/23 1817 --      Right arm       Pain Score       12/27/23 1817       8           Vitals:    12/27/23 1817   BP: 130/71   Pulse: 81         Visual Acuity      ED Medications  Medications   amoxicillin-clavulanate (AUGMENTIN) 875-125 mg per tablet 1 tablet (1 tablet Oral Given 12/27/23 1854)       Diagnostic Studies  Results Reviewed       None                   No orders to display              Procedures  Procedures         ED Course         33-year-old female with subacute worsening onset of dental pain.  Patient does not have visibly poor dentition or evidence of abscess on exam however her symptoms are likely consistent with worsening dental caries and associated infection.  Will treat with 1 week of Augmentin.  Advised patient to seek definitive care with a dentist.  Provided with a list of local dentists.                      SBIRT 22yo+      Flowsheet Row Most Recent Value   Initial Alcohol Screen: US AUDIT-C     1. How often do you have a drink containing alcohol? 1 Filed at: 12/27/2023 1817   2. How many drinks containing alcohol do you have on a typical day you are drinking?  1 Filed at: 12/27/2023 1817   3b. FEMALE Any Age, or MALE 65+: How often do you have 4 or more drinks on one occassion? 0 Filed at: 12/27/2023 1817   Audit-C Score 2 Filed at: 12/27/2023 1817   CARINA: How many times in the past year have you...    Used an illegal drug or used a prescription medication for non-medical reasons? Never Filed at: 12/27/2023 1817                      Medical Decision  Making  Risk  Prescription drug management.             Disposition  Final diagnoses:   Pain, dental     Time reflects when diagnosis was documented in both MDM as applicable and the Disposition within this note       Time User Action Codes Description Comment    12/27/2023  6:51 PM Josey Alan [K08.89] Pain, dental           ED Disposition       ED Disposition   Discharge    Condition   Stable    Date/Time   Wed Dec 27, 2023 1851    Comment   Angela Fu discharge to home/self care.                   Follow-up Information       Follow up With Specialties Details Why Contact Info Additional Information    [facility] Schedule an appointment as soon as possible for a visit   [address] [place] [id number] [phone] [facility], [address] [place] [address], [id number], [phone]            Discharge Medication List as of 12/27/2023  6:52 PM        START taking these medications    Details   amoxicillin-clavulanate (AUGMENTIN) 875-125 mg per tablet Take 1 tablet by mouth every 12 (twelve) hours for 7 days, Starting Wed 12/27/2023, Until Wed 1/3/2024, Normal           CONTINUE these medications which have NOT CHANGED    Details   betamethasone dipropionate (DIPROSONE) 0.05 % cream Apply topically 2 (two) times a day, Starting Fri 5/20/2022, Normal      calcipotriene (DOVONOX) 0.005 % ointment Apply topically 2 (two) times a day, Starting Fri 5/20/2022, Normal      ergocalciferol (VITAMIN D2) 50,000 units Take 1 capsule (50,000 Units total) by mouth once a week Take as one dose, Starting Thu 5/27/2021, Normal      fluticasone-salmeterol (Advair HFA) 115-21 MCG/ACT inhaler Inhale 2 puffs  in the morning and 2 puffs in the evening. Rinse mouth after use.., Starting Fri 5/20/2022, Normal      norethindrone-ethinyl estradiol (Balziva) 0.4-35 MG-MCG per tablet TAKE 1 TABLET BY MOUTH EVERY DAY AVOID PLACEBO PILL FOR 2 PACKS, THEN TAKE DAILY  INCLUDING PLACEBOS, Normal      rizatriptan (Maxalt-MLT) 5 MG disintegrating tablet Take 1 tablet (5 mg total) by mouth once as needed for migraine for up to 1 dose May repeat in 2 hours if needed, Starting Fri 5/20/2022, Normal      valACYclovir (VALTREX) 500 mg tablet Take 1 tablet (500 mg total) by mouth daily, Starting Thu 9/23/2021, Until Sun 9/18/2022, Normal             No discharge procedures on file.    PDMP Review       None            ED Provider  Electronically Signed by             Josey Alan MD  12/29/23 2052

## 2024-02-21 PROBLEM — Z01.419 ENCNTR FOR GYN EXAM (GENERAL) (ROUTINE) W/O ABN FINDINGS: Status: RESOLVED | Noted: 2019-06-14 | Resolved: 2024-02-21

## 2024-02-21 PROBLEM — Z11.3 ROUTINE SCREENING FOR STI (SEXUALLY TRANSMITTED INFECTION): Status: RESOLVED | Noted: 2018-06-18 | Resolved: 2024-02-21

## 2024-08-23 ENCOUNTER — OFFICE VISIT (OUTPATIENT)
Dept: FAMILY MEDICINE CLINIC | Facility: CLINIC | Age: 34
End: 2024-08-23
Payer: COMMERCIAL

## 2024-08-23 VITALS
DIASTOLIC BLOOD PRESSURE: 80 MMHG | WEIGHT: 195 LBS | BODY MASS INDEX: 34.55 KG/M2 | HEART RATE: 69 BPM | OXYGEN SATURATION: 98 % | RESPIRATION RATE: 16 BRPM | SYSTOLIC BLOOD PRESSURE: 116 MMHG | HEIGHT: 63 IN

## 2024-08-23 DIAGNOSIS — M54.6 CHRONIC MIDLINE THORACIC BACK PAIN: ICD-10-CM

## 2024-08-23 DIAGNOSIS — Z00.00 WELL ADULT EXAM: ICD-10-CM

## 2024-08-23 DIAGNOSIS — L40.9 PSORIASIS: ICD-10-CM

## 2024-08-23 DIAGNOSIS — Z79.899 OTHER LONG TERM (CURRENT) DRUG THERAPY: ICD-10-CM

## 2024-08-23 DIAGNOSIS — L40.50 PSORIATIC ARTHRITIS (HCC): ICD-10-CM

## 2024-08-23 DIAGNOSIS — K21.9 GASTROESOPHAGEAL REFLUX DISEASE WITHOUT ESOPHAGITIS: Primary | ICD-10-CM

## 2024-08-23 DIAGNOSIS — E53.8 B12 DEFICIENCY: ICD-10-CM

## 2024-08-23 DIAGNOSIS — E55.9 VITAMIN D DEFICIENCY: ICD-10-CM

## 2024-08-23 DIAGNOSIS — E78.2 MIXED HYPERLIPIDEMIA: ICD-10-CM

## 2024-08-23 DIAGNOSIS — G89.29 CHRONIC MIDLINE THORACIC BACK PAIN: ICD-10-CM

## 2024-08-23 DIAGNOSIS — E61.1 IRON DEFICIENCY: ICD-10-CM

## 2024-08-23 PROBLEM — B96.89 BV (BACTERIAL VAGINOSIS): Status: RESOLVED | Noted: 2018-10-18 | Resolved: 2024-08-23

## 2024-08-23 PROBLEM — R92.30 BREAST DENSITY: Status: RESOLVED | Noted: 2018-05-24 | Resolved: 2024-08-23

## 2024-08-23 PROBLEM — M79.641 PAIN OF RIGHT HAND: Status: RESOLVED | Noted: 2020-11-23 | Resolved: 2024-08-23

## 2024-08-23 PROBLEM — N63.0 BREAST NODULE: Status: RESOLVED | Noted: 2019-06-17 | Resolved: 2024-08-23

## 2024-08-23 PROBLEM — Z30.41 ENCOUNTER FOR SURVEILLANCE OF CONTRACEPTIVE PILLS: Status: RESOLVED | Noted: 2018-11-23 | Resolved: 2024-08-23

## 2024-08-23 PROBLEM — N64.4 BREAST TENDERNESS IN FEMALE: Status: RESOLVED | Noted: 2018-10-18 | Resolved: 2024-08-23

## 2024-08-23 PROBLEM — Z20.2 POSSIBLE EXPOSURE TO STD: Status: RESOLVED | Noted: 2020-01-30 | Resolved: 2024-08-23

## 2024-08-23 PROBLEM — R10.2 PELVIC PAIN: Status: RESOLVED | Noted: 2018-06-18 | Resolved: 2024-08-23

## 2024-08-23 PROBLEM — N76.0 BV (BACTERIAL VAGINOSIS): Status: RESOLVED | Noted: 2018-10-18 | Resolved: 2024-08-23

## 2024-08-23 PROBLEM — B00.9 HSV INFECTION: Status: RESOLVED | Noted: 2018-05-24 | Resolved: 2024-08-23

## 2024-08-23 PROBLEM — Z01.411 ENCNTR FOR GYN EXAM (GENERAL) (ROUTINE) W ABNORMAL FINDINGS: Status: RESOLVED | Noted: 2018-05-24 | Resolved: 2024-08-23

## 2024-08-23 PROBLEM — Z12.4 PAP SMEAR FOR CERVICAL CANCER SCREENING: Status: RESOLVED | Noted: 2018-06-18 | Resolved: 2024-08-23

## 2024-08-23 PROBLEM — N92.6 IRREGULAR MENSES: Status: RESOLVED | Noted: 2018-05-24 | Resolved: 2024-08-23

## 2024-08-23 PROBLEM — E66.09 CLASS 1 OBESITY DUE TO EXCESS CALORIES WITH SERIOUS COMORBIDITY AND BODY MASS INDEX (BMI) OF 33.0 TO 33.9 IN ADULT: Status: RESOLVED | Noted: 2020-11-23 | Resolved: 2024-08-23

## 2024-08-23 PROBLEM — E66.811 CLASS 1 OBESITY DUE TO EXCESS CALORIES WITH SERIOUS COMORBIDITY AND BODY MASS INDEX (BMI) OF 33.0 TO 33.9 IN ADULT: Status: RESOLVED | Noted: 2020-11-23 | Resolved: 2024-08-23

## 2024-08-23 PROCEDURE — 99395 PREV VISIT EST AGE 18-39: CPT | Performed by: FAMILY MEDICINE

## 2024-08-23 RX ORDER — OMEPRAZOLE 40 MG/1
40 CAPSULE, DELAYED RELEASE ORAL DAILY
Qty: 90 CAPSULE | Refills: 0 | Status: SHIPPED | OUTPATIENT
Start: 2024-08-23

## 2024-08-23 RX ORDER — TIZANIDINE 2 MG/1
2 TABLET ORAL
Qty: 90 TABLET | Refills: 0 | Status: SHIPPED | OUTPATIENT
Start: 2024-08-23

## 2024-08-23 RX ORDER — CALCIPOTRIENE 50 UG/G
OINTMENT TOPICAL 2 TIMES DAILY
Qty: 60 G | Refills: 1 | Status: SHIPPED | OUTPATIENT
Start: 2024-08-23

## 2024-08-23 RX ORDER — BETAMETHASONE DIPROPIONATE 0.5 MG/G
CREAM TOPICAL 2 TIMES DAILY
Qty: 45 G | Refills: 1 | Status: SHIPPED | OUTPATIENT
Start: 2024-08-23

## 2024-08-23 NOTE — ASSESSMENT & PLAN NOTE
Orders:    Ambulatory Referral to Rheumatology; Future    TSH, 3rd generation with Free T4 reflex    UA w Reflex to Microscopic w Reflex to Culture; Future    Magnesium    Comprehensive metabolic panel    CBC and differential

## 2024-08-23 NOTE — PROGRESS NOTES
Ambulatory Visit  Name: Angela Fu      : 1990      MRN: [id number]  Encounter Provider: Parish Chavez MD  Encounter Date: 2024   Encounter department: [facility]    Assessment & Plan  Well adult exam    Orders:    TSH, 3rd generation with Free T4 reflex    UA w Reflex to Microscopic w Reflex to Culture; Future    Magnesium    Comprehensive metabolic panel    CBC and differential    Psoriasis    Orders:    betamethasone dipropionate (DIPROSONE) 0.05 % cream; Apply topically 2 (two) times a day    calcipotriene (DOVONOX) 0.005 % ointment; Apply topically 2 (two) times a day    TSH, 3rd generation with Free T4 reflex    UA w Reflex to Microscopic w Reflex to Culture; Future    Magnesium    Comprehensive metabolic panel    CBC and differential    Gastroesophageal reflux disease without esophagitis    Orders:    XR chest pa & lateral; Future    omeprazole (PriLOSEC) 40 MG capsule; Take 1 capsule (40 mg total) by mouth daily    TSH, 3rd generation with Free T4 reflex    UA w Reflex to Microscopic w Reflex to Culture; Future    Magnesium    Comprehensive metabolic panel    CBC and differential    Psoriatic arthritis (HCC)    Orders:    Ambulatory Referral to Rheumatology; Future    TSH, 3rd generation with Free T4 reflex    UA w Reflex to Microscopic w Reflex to Culture; Future    Magnesium    Comprehensive metabolic panel    CBC and differential    Chronic midline thoracic back pain    Orders:    tiZANidine (ZANAFLEX) 2 mg tablet; Take 1 tablet (2 mg total) by mouth daily at bedtime    TSH, 3rd generation with Free T4 reflex    UA w Reflex to Microscopic w Reflex to Culture; Future    Magnesium    Comprehensive metabolic panel    CBC and differential    Vitamin D deficiency    Orders:    Vitamin D 25 hydroxy    TSH, 3rd generation with Free T4 reflex    UA w Reflex to Microscopic w Reflex to Culture; Future    Magnesium    Comprehensive metabolic panel    CBC and differential    B12  deficiency    Orders:    Vitamin B12    TSH, 3rd generation with Free T4 reflex    UA w Reflex to Microscopic w Reflex to Culture; Future    Magnesium    Comprehensive metabolic panel    CBC and differential    Mixed hyperlipidemia    Orders:    TSH, 3rd generation with Free T4 reflex    UA w Reflex to Microscopic w Reflex to Culture; Future    Magnesium    Lipid Panel with Direct LDL reflex    Comprehensive metabolic panel    CBC and differential    Iron deficiency    Orders:    TSH, 3rd generation with Free T4 reflex    UA w Reflex to Microscopic w Reflex to Culture; Future    Magnesium    Iron Panel (Includes Ferritin, Iron Sat%, Iron, and TIBC)    Comprehensive metabolic panel    CBC and differential    Other long term (current) drug therapy    Orders:    TSH, 3rd generation with Free T4 reflex    UA w Reflex to Microscopic w Reflex to Culture; Future    Magnesium    Hemoglobin A1C    Comprehensive metabolic panel    CBC and differential       Assessment & Plan  1. Psoriasis.  Prescriptions for betamethasone and calcipotriene have been refilled.    2. Chest pain.  The chest pain could be attributed to postnasal drip, heartburn, or pleurodynia. The use of Motrin may also contribute to the discomfort. A chest x-ray will be ordered. A 30-day course of omeprazole 40 mg will be initiated, with a follow-up in a month to assess its effectiveness. If symptoms persist after the 30-day course, the medication will be extended for an additional 2 months. If symptoms recur after this period, further investigation through scoping may be necessary.    3. Arthritis.  A referral to a rheumatologist will be made. Tizanidine will be prescribed as an alternative to ibuprofen for pain management.    4. Health maintenance.  Blood work will be ordered to assess vitamin D2, B12, diabetes, and thyroid levels. A mammogram will be scheduled by the OB-GYN.    Follow-up  A follow-up visit is scheduled in 3 months.        History of Present  Illness     History of Present Illness  The patient is a 34-year-old female who presents for evaluation of multiple medical concerns.    She has exhausted her supply of betamethasone and calcipotriene, medications she has been using for the past 2 years to manage her psoriasis.    She reports an escalation in her chest pain and suspects the presence of fluid in her lungs. She describes a sensation akin to choking on her own saliva and a persistent wheezing or gurgling sound, reminiscent of water in the lungs. These symptoms have persisted for 2 years, following a COVID-19 infection in 2021. She also experiences significant congestion and postnasal drip, which she attributes to allergies. She is interested in exploring the possibility of GERD as a cause for her heartburn and gassiness. She also mentions a sharp, stinging pain that she finds particularly bothersome.    Her arthritis, particularly in her hands, has worsened to the point where she fears dislocation. This has resulted in difficulty gripping objects, such as a steering wheel, and frequent dropping of items. She has previously consulted a rheumatologist but does not recall the outcome of the appointment. She has been managing the pain with daily doses of ibuprofen and Tylenol for the past 6 to 7 months.    She is due for a refill of her birth control pills and needs to schedule an appointment with her OB-GYN. She also wishes to have her blood work done, suspecting low levels of vitamin D2 and B12. She reports no current dental infections but has an upcoming appointment to address a potential cyst or ulcer on her gum that may be affecting a tooth. A root canal procedure is planned for this issue.    SOCIAL HISTORY  She is trying to quit smoking and it has been a few days since she had a cigarette.     Review of Systems   Constitutional:  Negative for fever and unexpected weight change.   HENT:  Negative for nosebleeds and trouble swallowing.    Eyes:   "Negative for visual disturbance.   Respiratory:  Negative for chest tightness and shortness of breath.    Cardiovascular:  Negative for chest pain, palpitations and leg swelling.   Gastrointestinal:  Negative for abdominal pain, constipation, diarrhea and nausea.   Endocrine: Negative for cold intolerance.   Genitourinary:  Negative for dysuria and urgency.   Musculoskeletal:  Positive for arthralgias. Negative for joint swelling and myalgias.   Skin:  Positive for rash.   Neurological:  Positive for headaches. Negative for tremors, seizures and syncope.   Hematological:  Does not bruise/bleed easily.   Psychiatric/Behavioral:  Negative for hallucinations and suicidal ideas. The patient is nervous/anxious.      Objective     /80 (BP Location: Right arm, Patient Position: Sitting, Cuff Size: Standard)   Pulse 69   Resp 16   Ht 5' 3\" (1.6 m)   Wt 88.5 kg (195 lb)   SpO2 98%   BMI 34.54 kg/m²     Physical Exam    Physical Exam  Vitals and nursing note reviewed.   Constitutional:       Appearance: She is well-developed. She is obese.   HENT:      Head: Normocephalic and atraumatic.      Right Ear: External ear normal.      Left Ear: External ear normal.      Nose: Nose normal.   Eyes:      Conjunctiva/sclera: Conjunctivae normal.      Pupils: Pupils are equal, round, and reactive to light.   Cardiovascular:      Rate and Rhythm: Normal rate and regular rhythm.      Heart sounds: Normal heart sounds. No murmur heard.  Pulmonary:      Effort: Pulmonary effort is normal.      Breath sounds: Normal breath sounds. No wheezing.   Abdominal:      General: Bowel sounds are normal.      Palpations: Abdomen is soft.   Musculoskeletal:         General: Tenderness present. Normal range of motion.      Cervical back: Normal range of motion and neck supple.   Lymphadenopathy:      Cervical: No cervical adenopathy.   Skin:     General: Skin is warm and dry.      Capillary Refill: Capillary refill takes less than 2 seconds. "      Findings: Rash present.   Neurological:      Mental Status: She is alert and oriented to person, place, and time.   Psychiatric:         Behavior: Behavior normal.         Thought Content: Thought content normal.         Judgment: Judgment normal.

## 2024-08-23 NOTE — ASSESSMENT & PLAN NOTE
Orders:    Vitamin B12    TSH, 3rd generation with Free T4 reflex    UA w Reflex to Microscopic w Reflex to Culture; Future    Magnesium    Comprehensive metabolic panel    CBC and differential

## 2024-08-23 NOTE — ASSESSMENT & PLAN NOTE
Orders:    Vitamin D 25 hydroxy    TSH, 3rd generation with Free T4 reflex    UA w Reflex to Microscopic w Reflex to Culture; Future    Magnesium    Comprehensive metabolic panel    CBC and differential

## 2024-08-23 NOTE — ASSESSMENT & PLAN NOTE
Orders:    betamethasone dipropionate (DIPROSONE) 0.05 % cream; Apply topically 2 (two) times a day    calcipotriene (DOVONOX) 0.005 % ointment; Apply topically 2 (two) times a day    TSH, 3rd generation with Free T4 reflex    UA w Reflex to Microscopic w Reflex to Culture; Future    Magnesium    Comprehensive metabolic panel    CBC and differential

## 2024-10-21 ENCOUNTER — TELEPHONE (OUTPATIENT)
Age: 34
End: 2024-10-21

## 2024-10-21 NOTE — TELEPHONE ENCOUNTER
Patient called office c/o A strong vaginal odor, vaginal itching on the outside of vagina with peeling/ flaking skin, white milky vaginal discharge that is sometimes thick and chunky. She also states she has urinary frequency and lower abdominal pain. PAtient has not been seen in the office in over 3 years, she will need a new patient appointment to re establish care. She states she only wants an appointment with Mariana Glover. First available appointment is in January. Patient does not want to try a different provider at this time. Scheduled an appointment for 1/17/25 and put her on the wait list per her request. Advised she be seen in urgent care for treatment in the meantime and informed her this would be routed to Mariana Glover as well. Patient states she is trying Boric Acid.

## 2024-12-06 NOTE — RESULT NOTES
Verified Results  * US PELVIS COMPLETE [facility] AND TRANSVAGINAL) [id number] 01:54PM Clemon Player Order Number: [id number]    - Patient Instructions: To schedule this appointment, please contact Central Scheduling at [phone]  Test Name Result Flag Reference   US PELVIS COMPLETE (TRANSABDOMINAL AND TRANSVAGINAL) (Report)     PELVIC ULTRASOUND, COMPLETE     INDICATION: Pelvic pain  COMPARISON: None     TECHNIQUE:  Transabdominal pelvic ultrasound was performed in sagittal and transverse planes with a curvilinear transducer  Additional transvaginal imaging was performed to better evaluate the endometrium and ovaries  Imaging included volumetric    sweeps as well as traditional still imaging technique  FINDINGS:     UTERUS:   The uterus is anteverted in position, measuring 9 9 x 4 0 x 5 7 cm  Contour and echotexture appear normal      The cervix shows no suspicious abnormality  ENDOMETRIUM:    Normal caliber of 11 mm  Homogenous and normal in appearance  OVARIES/ADNEXA:   Right ovary: 4 5 x 2 3 x 3 1 cm  No suspicious right ovarian abnormality  Doppler flow within normal limits  Left ovary: 2 9 x 1 9 x 2 0 cm  No suspicious left ovarian abnormality  Doppler flow within normal limits  No suspicious adnexal mass or loculated collections  There is a small amount of free fluid in the cul-de-sac  IMPRESSION:      Small amount of free fluid in the cul-de-sac which may be physiologic  Otherwise, unremarkable pelvic ultrasound            Workstation performed: [id number]     Signed by:   Aung Joya DO   5/24/17
98.2

## 2024-12-13 ENCOUNTER — APPOINTMENT (OUTPATIENT)
Dept: LAB | Facility: CLINIC | Age: 34
End: 2024-12-13

## 2024-12-13 DIAGNOSIS — G89.29 CHRONIC MIDLINE THORACIC BACK PAIN: ICD-10-CM

## 2024-12-13 DIAGNOSIS — Z00.00 WELL ADULT EXAM: ICD-10-CM

## 2024-12-13 DIAGNOSIS — E78.2 MIXED HYPERLIPIDEMIA: ICD-10-CM

## 2024-12-13 DIAGNOSIS — L40.50 PSORIATIC ARTHRITIS (HCC): ICD-10-CM

## 2024-12-13 DIAGNOSIS — K21.9 GASTROESOPHAGEAL REFLUX DISEASE WITHOUT ESOPHAGITIS: ICD-10-CM

## 2024-12-13 DIAGNOSIS — E53.8 B12 DEFICIENCY: ICD-10-CM

## 2024-12-13 DIAGNOSIS — L40.9 PSORIASIS: ICD-10-CM

## 2024-12-13 DIAGNOSIS — E55.9 VITAMIN D DEFICIENCY: ICD-10-CM

## 2024-12-13 DIAGNOSIS — E61.1 IRON DEFICIENCY: ICD-10-CM

## 2024-12-13 DIAGNOSIS — Z79.899 OTHER LONG TERM (CURRENT) DRUG THERAPY: ICD-10-CM

## 2024-12-13 DIAGNOSIS — M54.6 CHRONIC MIDLINE THORACIC BACK PAIN: ICD-10-CM

## 2025-01-10 ENCOUNTER — APPOINTMENT (OUTPATIENT)
Dept: LAB | Facility: CLINIC | Age: 35
End: 2025-01-10
Payer: COMMERCIAL

## 2025-01-10 LAB
25(OH)D3 SERPL-MCNC: 13.6 NG/ML (ref 30–100)
ALBUMIN SERPL BCG-MCNC: 4.3 G/DL (ref 3.5–5)
ALP SERPL-CCNC: 58 U/L (ref 34–104)
ALT SERPL W P-5'-P-CCNC: 18 U/L (ref 7–52)
ANION GAP SERPL CALCULATED.3IONS-SCNC: 6 MMOL/L (ref 4–13)
AST SERPL W P-5'-P-CCNC: 17 U/L (ref 13–39)
BASOPHILS # BLD AUTO: 0.03 THOUSANDS/ΜL (ref 0–0.1)
BASOPHILS NFR BLD AUTO: 0 % (ref 0–1)
BILIRUB SERPL-MCNC: 0.42 MG/DL (ref 0.2–1)
BILIRUB UR QL STRIP: NEGATIVE
BUN SERPL-MCNC: 18 MG/DL (ref 5–25)
CALCIUM SERPL-MCNC: 9 MG/DL (ref 8.4–10.2)
CHLORIDE SERPL-SCNC: 105 MMOL/L (ref 96–108)
CHOLEST SERPL-MCNC: 133 MG/DL (ref ?–200)
CLARITY UR: CLEAR
CO2 SERPL-SCNC: 27 MMOL/L (ref 21–32)
COLOR UR: NORMAL
CREAT SERPL-MCNC: 0.55 MG/DL (ref 0.6–1.3)
EOSINOPHIL # BLD AUTO: 0.09 THOUSAND/ΜL (ref 0–0.61)
EOSINOPHIL NFR BLD AUTO: 1 % (ref 0–6)
ERYTHROCYTE [DISTWIDTH] IN BLOOD BY AUTOMATED COUNT: 12.5 % (ref 11.6–15.1)
EST. AVERAGE GLUCOSE BLD GHB EST-MCNC: 100 MG/DL
FERRITIN SERPL-MCNC: 14 NG/ML (ref 11–307)
GFR SERPL CREATININE-BSD FRML MDRD: 121 ML/MIN/1.73SQ M
GLUCOSE P FAST SERPL-MCNC: 92 MG/DL (ref 65–99)
GLUCOSE UR STRIP-MCNC: NEGATIVE MG/DL
HBA1C MFR BLD: 5.1 %
HCT VFR BLD AUTO: 42.4 % (ref 34.8–46.1)
HDLC SERPL-MCNC: 42 MG/DL
HGB BLD-MCNC: 13.9 G/DL (ref 11.5–15.4)
HGB UR QL STRIP.AUTO: NEGATIVE
IMM GRANULOCYTES # BLD AUTO: 0.04 THOUSAND/UL (ref 0–0.2)
IMM GRANULOCYTES NFR BLD AUTO: 1 % (ref 0–2)
IRON SATN MFR SERPL: 21 % (ref 15–50)
IRON SERPL-MCNC: 90 UG/DL (ref 50–212)
KETONES UR STRIP-MCNC: NEGATIVE MG/DL
LDLC SERPL CALC-MCNC: 81 MG/DL (ref 0–100)
LEUKOCYTE ESTERASE UR QL STRIP: NEGATIVE
LYMPHOCYTES # BLD AUTO: 2.54 THOUSANDS/ΜL (ref 0.6–4.47)
LYMPHOCYTES NFR BLD AUTO: 30 % (ref 14–44)
MAGNESIUM SERPL-MCNC: 2 MG/DL (ref 1.9–2.7)
MCH RBC QN AUTO: 29 PG (ref 26.8–34.3)
MCHC RBC AUTO-ENTMCNC: 32.8 G/DL (ref 31.4–37.4)
MCV RBC AUTO: 88 FL (ref 82–98)
MONOCYTES # BLD AUTO: 0.51 THOUSAND/ΜL (ref 0.17–1.22)
MONOCYTES NFR BLD AUTO: 6 % (ref 4–12)
NEUTROPHILS # BLD AUTO: 5.23 THOUSANDS/ΜL (ref 1.85–7.62)
NEUTS SEG NFR BLD AUTO: 62 % (ref 43–75)
NITRITE UR QL STRIP: NEGATIVE
NRBC BLD AUTO-RTO: 0 /100 WBCS
PH UR STRIP.AUTO: 6 [PH]
PLATELET # BLD AUTO: 247 THOUSANDS/UL (ref 149–390)
PMV BLD AUTO: 11.5 FL (ref 8.9–12.7)
POTASSIUM SERPL-SCNC: 4.8 MMOL/L (ref 3.5–5.3)
PROT SERPL-MCNC: 6.9 G/DL (ref 6.4–8.4)
PROT UR STRIP-MCNC: NEGATIVE MG/DL
RBC # BLD AUTO: 4.8 MILLION/UL (ref 3.81–5.12)
SODIUM SERPL-SCNC: 138 MMOL/L (ref 135–147)
SP GR UR STRIP.AUTO: 1.02 (ref 1–1.03)
TIBC SERPL-MCNC: 432.6 UG/DL (ref 250–450)
TRANSFERRIN SERPL-MCNC: 309 MG/DL (ref 203–362)
TRIGL SERPL-MCNC: 48 MG/DL (ref ?–150)
TSH SERPL DL<=0.05 MIU/L-ACNC: 1.29 UIU/ML (ref 0.45–4.5)
UIBC SERPL-MCNC: 343 UG/DL (ref 155–355)
UROBILINOGEN UR STRIP-ACNC: <2 MG/DL
VIT B12 SERPL-MCNC: 376 PG/ML (ref 180–914)
WBC # BLD AUTO: 8.44 THOUSAND/UL (ref 4.31–10.16)

## 2025-01-13 ENCOUNTER — RESULTS FOLLOW-UP (OUTPATIENT)
Dept: FAMILY MEDICINE CLINIC | Facility: CLINIC | Age: 35
End: 2025-01-13

## 2025-01-16 NOTE — PROGRESS NOTES
Assessment/Plan:  Normal wet mount. No infection noted.  Pap with high risk HPV testing every 5 years, if normal. Collected today.   Sexually transmitted infection testing as indicated.Serum testing and in office testing ordered/ collected as requested. See below.   Exercise most days of week-minimum of 150-300 minutes per week.   Obtain appropriate diet and hydration.   Calcium 1000 mg (in divided doses-max 600 mg at one time) + 600 vit D daily.  HPV 9 vaccine recommended through age 45. Check with your insurance for coverage. If covered, call office to schedule start of vaccine series.    Monthly breast self exam recommended.   Diagnostic mammogram and bilateral breast ultrasound ordered. Call to schedule. Kegels 20 times twice daily.   Call your insurance company to verify coverage prior to completing any ordered tests.   Declined to smoking cessation.   Valacyclovir ordered and sent to pharmacy on file. Take as directed.   Return to office in one year or sooner, if needed.        1. Encntr for gyn exam (general) (routine) w abnormal findings  2. Encounter for Papanicolaou smear for cervical cancer screening  -     Liquid-based pap, screening  3. Breast mass in female  -     Mammo diagnostic bilateral w 3d and cad; Future  -     US BREAST BILATERAL LIMITED (DIAGNOSTIC); Future; Expected date: 01/17/2025  4. Breast cancer screening, high risk patient  -     Mammo diagnostic bilateral w 3d and cad; Future  -     US BREAST BILATERAL LIMITED (DIAGNOSTIC); Future; Expected date: 01/17/2025  5. History of herpes genitalis  -     valACYclovir (VALTREX) 1,000 mg tablet; Take 1 tablet (1,000 mg total) by mouth daily  6. Dysmenorrhea  -     US pelvis complete w transvaginal; Future; Expected date: 01/17/2025  -     POCT wet mount  7. Pelvic pain  -     US pelvis complete w transvaginal; Future; Expected date: 01/17/2025  -     POCT wet mount  8. Screening for STD (sexually transmitted disease)  -     Chlamydia/GC amplified  "DNA by PCR  -     RPR-Syphilis Screening (Total Syphilis IGG/IGM); Future  -     Hepatitis C antibody  -     Hepatitis B surface antigen; Future  -     HIV 1/2 AG/AB w Reflex [facility] for 2 yr old and above; Future  -     Trichomonas vaginalis Thin prep  9. Vulvar itching  -     POCT wet mount             Subjective:      Patient ID: Angela Fu is a 34 y.o. female.    HPI    Angela Fu is a 34 y.o.  (15 yo girl, 12 yo girl 18 yo boy; 6 mo granddaughter ) female who is here today as a new/former patient for her annual visit.     Monthly menses x 4-7 days with heavy flow x 2 days then mod to light flow. Menses is not acceptable due to dysmenorrhea. Lower pelvic pain starts one week before her menses and ends on day 2. Rates pain 8-10/10 at worst. Tylenol and motrin takes the edge off the pain but does not alleviate it.   Pelvic pain noted also at other times of of her cycle. Points to bilateral lower pelvis. Rates pain 5/10 and \"is not bad\" but described as \"dull/sharp \" and less than her menstrual pain.   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 Plans to attempt to quit on her own again.     Angela Fu is sexually active with male partner of 22 years on and off (childrens father). They live together. She is monogamous and is unsure of his monogamy. Feels safe in this relationship. Denies vaginal pain,bleeding or dryness.    She uses tubal ligation for contraception.    She is interested in STD screening today, including trich, GC/CT and serum testing..   She admits to white colored to clear vaginal discharge, vulvar itching  without an odor. She intermittent takes boric acid supp intravaginally if she thinks BV may be starting.   She has no urinary concerns, does not have incontinence.  No bowel concerns.  No breast concerns.     Last pap: 2018 normal with negative HR HPV   Mammogram: 2021  " "mammo and ultrasound-> normal with LTC of 20.61. ABUS ordered but not completed.   Colonoscopy: Not on file  DEXA scan: Not on file  HPV vaccine series:No    Family history of cancer:   Cancer-related family history includes Breast cancer in her paternal aunt; Breast cancer (age of onset: 28) in her mother; Breast cancer (age of onset: 40) in an other family member; Cancer in her paternal aunt; Lung cancer (age of onset: 28) in her mother.      The following portions of the patient's history were reviewed and updated as appropriate: allergies, current medications, past family history, past medical history, past social history, past surgical history, and problem list.    Review of Systems   Constitutional: Negative.  Negative for activity change, appetite change, chills, diaphoresis, fatigue, fever and unexpected weight change.   HENT:  Negative for congestion, dental problem, sneezing, sore throat and trouble swallowing.    Eyes:  Negative for visual disturbance.   Respiratory:  Negative for chest tightness and shortness of breath.    Cardiovascular:  Negative for chest pain and leg swelling.   Gastrointestinal:  Negative for abdominal pain, constipation, diarrhea, nausea and vomiting.   Genitourinary:  Positive for menstrual problem, pelvic pain and vaginal discharge. Negative for difficulty urinating, dyspareunia, dysuria, frequency, hematuria, urgency, vaginal bleeding and vaginal pain.        Vulvar itching   Musculoskeletal:  Negative for back pain and neck pain.   Skin: Negative.    Allergic/Immunologic: Negative.    Neurological:  Negative for weakness and headaches.   Hematological:  Negative for adenopathy.   Psychiatric/Behavioral: Negative.           Objective:      /76 (BP Location: Left arm, Patient Position: Sitting, Cuff Size: Standard)   Ht 5' 3.5\" (1.613 m)   Wt 88.9 kg (196 lb)   LMP 12/20/2024 (Exact Date)   BMI 34.18 kg/m²          Physical Exam  Vitals and nursing note reviewed. "   Constitutional:       Appearance: Normal appearance. She is well-developed.      Comments: BMI 34   HENT:      Head: Normocephalic and atraumatic.   Eyes:      General:         Right eye: No discharge.         Left eye: No discharge.   Neck:      Thyroid: No thyromegaly.      Trachea: Trachea normal.   Cardiovascular:      Rate and Rhythm: Normal rate and regular rhythm.      Heart sounds: Normal heart sounds.   Pulmonary:      Effort: Pulmonary effort is normal.      Breath sounds: Normal breath sounds.   Chest:   Breasts:     Breasts are symmetrical.      Right: Mass present. No inverted nipple, nipple discharge, skin change or tenderness.      Left: Tenderness present. No inverted nipple, mass, nipple discharge or skin change.          Comments: Right breast mass 6 cm FN at 10:00 and 5 cm FN at 6-7:00  Abdominal:      Palpations: Abdomen is soft.   Genitourinary:     General: Normal vulva.      Exam position: Lithotomy position.      Labia:         Right: No rash, tenderness, lesion or injury.         Left: No rash, tenderness, lesion or injury.       Urethra: No prolapse, urethral pain, urethral swelling or urethral lesion.      Vagina: Normal. No signs of injury and foreign body. No vaginal discharge, erythema, tenderness or bleeding.      Cervix: Normal.      Uterus: Normal.       Adnexa:         Right: No mass, tenderness or fullness.          Left: No mass, tenderness or fullness.        Rectum: No external hemorrhoid.          Comments: Slight erythema noted at base of vaginal opening  Musculoskeletal:         General: Normal range of motion.      Cervical back: Normal range of motion and neck supple.   Lymphadenopathy:      Head:      Right side of head: No submental, submandibular or tonsillar adenopathy.      Left side of head: No submental, submandibular or tonsillar adenopathy.      Cervical: No cervical adenopathy.      Upper Body:      Right upper body: No supraclavicular or axillary adenopathy.       Left upper body: No supraclavicular or axillary adenopathy.      Lower Body: No right inguinal adenopathy. No left inguinal adenopathy.   Skin:     General: Skin is warm and dry.   Neurological:      Mental Status: She is alert and oriented to person, place, and time.   Psychiatric:         Mood and Affect: Mood normal.         Behavior: Behavior normal.

## 2025-01-17 ENCOUNTER — OFFICE VISIT (OUTPATIENT)
Dept: OBGYN CLINIC | Facility: MEDICAL CENTER | Age: 35
End: 2025-01-17
Payer: COMMERCIAL

## 2025-01-17 VITALS
WEIGHT: 196 LBS | DIASTOLIC BLOOD PRESSURE: 76 MMHG | HEIGHT: 64 IN | SYSTOLIC BLOOD PRESSURE: 110 MMHG | BODY MASS INDEX: 33.46 KG/M2

## 2025-01-17 DIAGNOSIS — Z86.19 HISTORY OF HERPES GENITALIS: ICD-10-CM

## 2025-01-17 DIAGNOSIS — L29.2 VULVAR ITCHING: ICD-10-CM

## 2025-01-17 DIAGNOSIS — Z01.411 ENCNTR FOR GYN EXAM (GENERAL) (ROUTINE) W ABNORMAL FINDINGS: Primary | ICD-10-CM

## 2025-01-17 DIAGNOSIS — N94.6 DYSMENORRHEA: ICD-10-CM

## 2025-01-17 DIAGNOSIS — Z12.4 ENCOUNTER FOR PAPANICOLAOU SMEAR FOR CERVICAL CANCER SCREENING: ICD-10-CM

## 2025-01-17 DIAGNOSIS — Z12.39 BREAST CANCER SCREENING, HIGH RISK PATIENT: ICD-10-CM

## 2025-01-17 DIAGNOSIS — R10.2 PELVIC PAIN: ICD-10-CM

## 2025-01-17 DIAGNOSIS — Z11.3 SCREENING FOR STD (SEXUALLY TRANSMITTED DISEASE): ICD-10-CM

## 2025-01-17 DIAGNOSIS — N63.0 BREAST MASS IN FEMALE: ICD-10-CM

## 2025-01-17 LAB
BV WHIFF TEST VAG QL: NORMAL
CLUE CELLS SPEC QL WET PREP: NORMAL
PH SMN: 4.5 [PH]
SL AMB POCT WET MOUNT: NORMAL
T VAGINALIS VAG QL WET PREP: NORMAL
YEAST VAG QL WET PREP: NORMAL

## 2025-01-17 PROCEDURE — 87210 SMEAR WET MOUNT SALINE/INK: CPT | Performed by: NURSE PRACTITIONER

## 2025-01-17 PROCEDURE — G0145 SCR C/V CYTO,THINLAYER,RESCR: HCPCS | Performed by: NURSE PRACTITIONER

## 2025-01-17 PROCEDURE — 87591 N.GONORRHOEAE DNA AMP PROB: CPT | Performed by: NURSE PRACTITIONER

## 2025-01-17 PROCEDURE — 87661 TRICHOMONAS VAGINALIS AMPLIF: CPT | Performed by: NURSE PRACTITIONER

## 2025-01-17 PROCEDURE — 87491 CHLMYD TRACH DNA AMP PROBE: CPT | Performed by: NURSE PRACTITIONER

## 2025-01-17 PROCEDURE — G0476 HPV COMBO ASSAY CA SCREEN: HCPCS | Performed by: NURSE PRACTITIONER

## 2025-01-17 PROCEDURE — S0610 ANNUAL GYNECOLOGICAL EXAMINA: HCPCS | Performed by: NURSE PRACTITIONER

## 2025-01-17 RX ORDER — AMOXICILLIN 500 MG/1
500 CAPSULE ORAL 3 TIMES DAILY
COMMUNITY
Start: 2024-11-15 | End: 2025-01-24

## 2025-01-17 RX ORDER — VALACYCLOVIR HYDROCHLORIDE 1 G/1
1000 TABLET, FILM COATED ORAL DAILY
Qty: 90 TABLET | Refills: 3 | Status: SHIPPED | OUTPATIENT
Start: 2025-01-17 | End: 2026-01-17

## 2025-01-17 NOTE — PATIENT INSTRUCTIONS
Normal wet mount. No vaginal infection noted.   Pap with high risk HPV testing every 5 years, if normal. Collected today.   Sexually transmitted infection testing as indicated.Serum testing and in office testing ordered/ collected as requested.   Exercise most days of week-minimum of 150-300 minutes per week.   Obtain appropriate diet and hydration.   Calcium 1000 mg (in divided doses-max 600 mg at one time) + 600 vit D daily.  HPV 9 vaccine recommended through age 45. Check with your insurance for coverage. If covered, call office to schedule start of vaccine series.    Monthly breast self exam recommended.   Diagnostic mammogram and bilateral breast ultrasound ordered. Call to schedule. Kegels 20 times twice daily.   Call your insurance company to verify coverage prior to completing any ordered tests.   Declined to smoking cessation.   Valacyclovir ordered and sent to pharmacy on file. Take as directed.   Return to office in one year or sooner, if needed.

## 2025-01-20 ENCOUNTER — RESULTS FOLLOW-UP (OUTPATIENT)
Dept: OBGYN CLINIC | Facility: MEDICAL CENTER | Age: 35
End: 2025-01-20

## 2025-01-20 LAB
HPV HR 12 DNA CVX QL NAA+PROBE: NEGATIVE
HPV16 DNA CVX QL NAA+PROBE: NEGATIVE
HPV18 DNA CVX QL NAA+PROBE: NEGATIVE

## 2025-01-21 LAB
C TRACH DNA SPEC QL NAA+PROBE: NEGATIVE
N GONORRHOEA DNA SPEC QL NAA+PROBE: NEGATIVE
T VAGINALIS DNA SPEC QL NAA+PROBE: NEGATIVE

## 2025-01-21 NOTE — RESULT ENCOUNTER NOTE
Negative gonorrhea and chlamydia culture  Negative trich on culture.  Detail Level: Detailed Detail Level: Simple

## 2025-01-23 LAB
LAB AP GYN PRIMARY INTERPRETATION: NORMAL
Lab: NORMAL

## 2025-01-24 ENCOUNTER — APPOINTMENT (OUTPATIENT)
Dept: LAB | Facility: CLINIC | Age: 35
End: 2025-01-24
Payer: COMMERCIAL

## 2025-01-24 ENCOUNTER — OFFICE VISIT (OUTPATIENT)
Dept: FAMILY MEDICINE CLINIC | Facility: CLINIC | Age: 35
End: 2025-01-24
Payer: COMMERCIAL

## 2025-01-24 VITALS
DIASTOLIC BLOOD PRESSURE: 70 MMHG | BODY MASS INDEX: 34.15 KG/M2 | RESPIRATION RATE: 16 BRPM | WEIGHT: 200 LBS | SYSTOLIC BLOOD PRESSURE: 110 MMHG | HEIGHT: 64 IN | HEART RATE: 68 BPM | OXYGEN SATURATION: 99 %

## 2025-01-24 DIAGNOSIS — Z11.3 SCREENING FOR STD (SEXUALLY TRANSMITTED DISEASE): ICD-10-CM

## 2025-01-24 DIAGNOSIS — L40.9 PSORIASIS: ICD-10-CM

## 2025-01-24 DIAGNOSIS — K21.9 GASTROESOPHAGEAL REFLUX DISEASE WITHOUT ESOPHAGITIS: ICD-10-CM

## 2025-01-24 DIAGNOSIS — E55.9 VITAMIN D DEFICIENCY: ICD-10-CM

## 2025-01-24 DIAGNOSIS — E53.8 B12 DEFICIENCY: Primary | ICD-10-CM

## 2025-01-24 DIAGNOSIS — G43.C0 PERIODIC HEADACHE SYNDROME, NOT INTRACTABLE: ICD-10-CM

## 2025-01-24 DIAGNOSIS — Z87.19 HISTORY OF DENTAL ABSCESS: ICD-10-CM

## 2025-01-24 DIAGNOSIS — F17.210 CIGARETTE NICOTINE DEPENDENCE WITHOUT COMPLICATION: ICD-10-CM

## 2025-01-24 DIAGNOSIS — L40.50 PSORIATIC ARTHRITIS (HCC): ICD-10-CM

## 2025-01-24 LAB
HBV SURFACE AG SER QL: NORMAL
HCV AB SER QL: NORMAL
HIV 1+2 AB+HIV1 P24 AG SERPL QL IA: NORMAL
HIV 2 AB SERPL QL IA: NORMAL
HIV1 AB SERPL QL IA: NORMAL
HIV1 P24 AG SERPL QL IA: NORMAL
TREPONEMA PALLIDUM IGG+IGM AB [PRESENCE] IN SERUM OR PLASMA BY IMMUNOASSAY: NORMAL

## 2025-01-24 PROCEDURE — 36415 COLL VENOUS BLD VENIPUNCTURE: CPT

## 2025-01-24 PROCEDURE — 87340 HEPATITIS B SURFACE AG IA: CPT

## 2025-01-24 PROCEDURE — 99214 OFFICE O/P EST MOD 30 MIN: CPT | Performed by: FAMILY MEDICINE

## 2025-01-24 PROCEDURE — 86780 TREPONEMA PALLIDUM: CPT

## 2025-01-24 PROCEDURE — 87389 HIV-1 AG W/HIV-1&-2 AB AG IA: CPT

## 2025-01-24 PROCEDURE — 86803 HEPATITIS C AB TEST: CPT | Performed by: NURSE PRACTITIONER

## 2025-01-24 RX ORDER — IRON HEME POLYPEPTIDE/FOLIC AC 12-1MG
TABLET ORAL
Qty: 180 CAPSULE | Refills: 11 | Status: SHIPPED | OUTPATIENT
Start: 2025-01-24

## 2025-01-24 NOTE — PROGRESS NOTES
Name: Angela Fu      : 1990      MRN: [id number]  Encounter Provider: Parish Chavez MD  Encounter Date: 2025   Encounter department: [facility]    Assessment & Plan  B12 deficiency    Orders:    cyanocobalamin (VITAMIN B-12) 500 MCG tablet; Take 1 tablet (500 mcg total) by mouth daily    Gastroesophageal reflux disease without esophagitis         History of dental abscess    Orders:    Ambulatory Referral to Oral Maxillofacial Surgery; Future    Psoriatic arthritis (HCC)    Orders:    Ambulatory Referral to Rheumatology; Future    Psoriasis         Cigarette nicotine dependence without complication         Vitamin D deficiency    Orders:    Cholecalciferol (Dialyvite Vitamin D 5000) 125 MCG (5000 UT) capsule; Take 1 cap 2 times a day x 3 months then 1 cap daily    Periodic headache syndrome, not intractable           Assessment & Plan  1. Gastrointestinal issues.  Her symptoms may be indicative of an H. pylori infection or a dysbiosis, leading to increased gas, bloating, and indigestion. The prolonged use of amoxicillin could have disrupted her gut microbiota. Her CBC results do not suggest sepsis. She will commence a probiotic regimen for a minimum of 1 month. A prescription for omeprazole 20 mg daily for 3 weeks has been provided to mitigate potential gastric damage from Motrin use. She is advised to consume iron-rich foods and take vitamin D and B12 supplements. She is also encouraged to hydrate with Smart Water or Liquid IV. If there is no significant improvement in her gastrointestinal symptoms after 1 month of probiotic use, a referral to a gastroenterologist will be considered.    2. Headaches.  Her headaches could be attributed to deficiencies in vitamin D, B12, or iron. The location of her headaches suggests they may be tension-related. She is advised to take vitamin D and B12 supplements.    3. Oral abscesses.  A referral to an Oral and Maxillofacial Surgeon ([facility]) has  been made for further evaluation and management of her dental abscesses. If she does not receive a call from [facility] within a week, she should notify the office.    4. Psoriasis.  Her psoriasis appears to be worsening, potentially due to an autoimmune process. A referral to a rheumatologist has been made for further evaluation and potential initiation of Otezla therapy. She is advised to continue using topical treatments for her psoriasis.    5. Back pain.  Her back pain is likely due to muscle spasms, possibly from inadequate support from her sports bra or poor posture. The pain is not consistent with shingles or neuropathy. She is advised to strengthen her core muscles, maintain good posture, and ensure adequate cross support. She is also encouraged to stretch regularly and hydrate with Smart Water, Liquid IV, or sugar-free Gatorade.    6. Smoking cessation.  She is advised to consider using Zyn nicotine pouches as a smoking cessation aid.    Follow-up  The patient is scheduled for a virtual follow-up visit in 2 months.       History of Present Illness     History of Present Illness  The patient presents for evaluation of gastrointestinal issues, headaches, oral abscesses, psoriasis, back pain, and smoking cessation.    She has been experiencing gastrointestinal disturbances, which she attributes to gas rather than heartburn. She has not undergone an EGD and has not tried probiotics. She has been on amoxicillin for 4 months due to persistent oral abscesses. She has taken approximately 50 tablets of amoxicillin to date. She is concerned about potential sepsis due to her recurrent abscesses. She reports occasional choking episodes, suggestive of aspiration. She was previously prescribed omeprazole but discontinued its use due to perceived ineffectiveness.    She has been experiencing recurrent headaches, which she believes are migraines. The pain is localized to both parietal regions and the occipital area. She has been  "managing the pain with increased doses of Motrin and Tylenol, taken daily. She is concerned about potential kidney damage from this regimen.    She has been on amoxicillin for 4 months due to persistent oral abscesses. She has taken approximately 50 tablets of amoxicillin to date. She is concerned about potential sepsis due to her recurrent abscesses.    Her psoriasis remains stable, with persistent erythematous lesions. She has noticed new lesions on her hands and legs, which resolve with topical treatment. However, the lesions on her elbows are refractory to treatment. She reports severe arthritis pain and is considering early disability filing due to loss of hand function. She has limited time off work, with Fridays being her only day off.    She has a long-standing history of back pain following an accident, which has recently worsened. She experiences a burning sensation on both sides of her spine, occurring 6 to 7 times daily and lasting approximately 3 minutes. She wears a sports bra for support. She is considering a referral to a pain management specialist. She plans to start gym workouts from 02/01/2025.    She continues to smoke, albeit less frequently, primarily during periods of stress.    SOCIAL HISTORY  - Smokes cigarettes, primarily when stressed    FAMILY HISTORY  - Family history of kidney issues    MEDICATIONS  - Valacyclovir  - Omeprazole  - Amoxicillin  - Motrin  - Tylenol     Review of Systems  Objective   /70   Pulse 68   Resp 16   Ht 5' 3.5\" (1.613 m)   Wt 90.7 kg (200 lb)   LMP 12/20/2024 (Exact Date)   SpO2 99%   BMI 34.87 kg/m²     Physical Exam  ROS:  Constitutional:  Negative for activity change, appetite change and fever.   HENT:  Negative for congestion, nosebleeds and trouble swallowing.    Eyes:  Negative for itching.   Respiratory:  Negative for cough and chest tightness.    Cardiovascular:  Negative for chest pain and palpitations.   Gastrointestinal:  Negative for " abdominal pain, constipation, diarrhea and nausea.   Endocrine: Negative for cold intolerance.   Genitourinary:  Negative for frequency.   Musculoskeletal:  Negative for gait problem and joint swelling.   Skin:  Negative for rash.   Allergic/Immunologic: Negative for immunocompromised state.   Neurological:  Negative for dizziness, tremors, seizures, syncope and headaches.   Psychiatric/Behavioral:  Negative for hallucinations and suicidal ideas.      General Appearance: is well-developed, NAD.  Vital signs: Patient's weight is 194.  HEENT: NCAT EOMI, reactive pupils, OP clear, no Nasal DC, no LAD in the neck.  Respiratory: Lungs clear BL with no wheezing Rhonchi or Rales.  Cardiovascular: +S1 S2 RRR no murmur.  Gastrointestinal: Abdomen soft NT ND + bowel sounds.  Back, Musculoskeletal: wnl.  Skin: Warm and dry, no rash.  Neurological: Normal.  Psychiatric: wnl.  Physical Exam

## 2025-01-24 NOTE — ASSESSMENT & PLAN NOTE
Orders:    Cholecalciferol (Dialyvite Vitamin D 5000) 125 MCG (5000 UT) capsule; Take 1 cap 2 times a day x 3 months then 1 cap daily

## 2025-01-24 NOTE — ASSESSMENT & PLAN NOTE
Orders:    cyanocobalamin (VITAMIN B-12) 500 MCG tablet; Take 1 tablet (500 mcg total) by mouth daily

## 2025-02-10 DIAGNOSIS — G89.29 CHRONIC MIDLINE THORACIC BACK PAIN: ICD-10-CM

## 2025-02-10 DIAGNOSIS — M54.6 CHRONIC MIDLINE THORACIC BACK PAIN: ICD-10-CM

## 2025-02-10 DIAGNOSIS — K21.9 GASTROESOPHAGEAL REFLUX DISEASE WITHOUT ESOPHAGITIS: ICD-10-CM

## 2025-02-11 RX ORDER — OMEPRAZOLE 40 MG/1
40 CAPSULE, DELAYED RELEASE ORAL DAILY
Qty: 90 CAPSULE | Refills: 0 | Status: SHIPPED | OUTPATIENT
Start: 2025-02-11

## 2025-02-11 RX ORDER — TIZANIDINE 2 MG/1
2 TABLET ORAL
Qty: 90 TABLET | Refills: 0 | Status: SHIPPED | OUTPATIENT
Start: 2025-02-11

## 2025-03-19 RX ORDER — AMOXICILLIN 500 MG/1
CAPSULE ORAL
COMMUNITY
Start: 2025-01-24 | End: 2025-03-21

## 2025-03-19 RX ORDER — DIAZEPAM 10 MG/1
TABLET ORAL
COMMUNITY
Start: 2025-01-24

## 2025-03-21 ENCOUNTER — TELEMEDICINE (OUTPATIENT)
Dept: FAMILY MEDICINE CLINIC | Facility: CLINIC | Age: 35
End: 2025-03-21
Payer: COMMERCIAL

## 2025-03-21 VITALS — BODY MASS INDEX: 34.87 KG/M2 | HEIGHT: 64 IN

## 2025-03-21 DIAGNOSIS — L40.50 PSORIATIC ARTHRITIS (HCC): Primary | ICD-10-CM

## 2025-03-21 DIAGNOSIS — G43.C0 PERIODIC HEADACHE SYNDROME, NOT INTRACTABLE: ICD-10-CM

## 2025-03-21 DIAGNOSIS — Z87.19 HISTORY OF DENTAL ABSCESS: ICD-10-CM

## 2025-03-21 DIAGNOSIS — L40.9 PSORIASIS: ICD-10-CM

## 2025-03-21 PROCEDURE — 99214 OFFICE O/P EST MOD 30 MIN: CPT | Performed by: FAMILY MEDICINE

## 2025-03-21 RX ORDER — PREDNISONE 10 MG/1
TABLET ORAL
Qty: 18 TABLET | Refills: 0 | Status: SHIPPED | OUTPATIENT
Start: 2025-03-21

## 2025-03-21 NOTE — PROGRESS NOTES
"Virtual Regular VisitName: Angela Fu      : 1990      MRN: [id number]  Encounter Provider: Parish Chavez MD  Encounter Date: 3/21/2025   Encounter department: [facility]  :  Assessment & Plan  Psoriatic arthritis (HCC)    Orders:    predniSONE 10 mg tablet; Take 1 tablet tid x3d then 1 tab bid x3d then 1 tab qd x3d    Psoriasis    Orders:    predniSONE 10 mg tablet; Take 1 tablet tid x3d then 1 tab bid x3d then 1 tab qd x3d    Periodic headache syndrome, not intractable         History of dental abscess             History of Present Illness     HPI    Never got a call from [facility] or rheum       Still with psoratic issues   Both systemic and derm    Worsening     Also with new diagnosis of CO leak in the house that was fixed     Feeling less HA now     Noticed more hair growth over her elbows where she applies the creams     [facility] dental surgery    [facility]  rheum        Review of Systems   Constitutional:  Negative for fever and unexpected weight change.   HENT:  Negative for nosebleeds and trouble swallowing.    Eyes:  Negative for visual disturbance.   Respiratory:  Negative for chest tightness and shortness of breath.    Cardiovascular:  Negative for chest pain, palpitations and leg swelling.   Gastrointestinal:  Negative for abdominal pain, constipation, diarrhea and nausea.   Endocrine: Negative for cold intolerance.   Genitourinary:  Negative for dysuria and urgency.   Musculoskeletal:  Positive for arthralgias. Negative for joint swelling and myalgias.   Skin:  Positive for rash.   Neurological:  Positive for headaches. Negative for tremors, seizures and syncope.   Hematological:  Does not bruise/bleed easily.   Psychiatric/Behavioral:  Negative for hallucinations and suicidal ideas. The patient is nervous/anxious.        Objective   Ht 5' 3.5\" (1.613 m)   BMI 34.87 kg/m²     Physical Exam  Vitals reviewed.   Constitutional:       Appearance: " She is well-developed.   HENT:      Head: Normocephalic and atraumatic.   Pulmonary:      Effort: Pulmonary effort is normal.   Skin:     General: Skin is warm and dry.      Findings: Rash present.   Neurological:      Mental Status: She is alert and oriented to person, place, and time.   Psychiatric:         Behavior: Behavior normal.         Thought Content: Thought content normal.         Judgment: Judgment normal.         Administrative Statements   Encounter provider Parish Chavez MD    The Patient is located at Home and in the following state in which I hold an active license [place].    The patient was identified by name and date of birth. Angela Fu was informed that this is a telemedicine visit and that the visit is being conducted through the [employer] platform. She agrees to proceed..  My office door was closed. No one else was in the room.  She acknowledged consent and understanding of privacy and security of the video platform. The patient has agreed to participate and understands they can discontinue the visit at any time.

## 2025-03-21 NOTE — ASSESSMENT & PLAN NOTE
Orders:    predniSONE 10 mg tablet; Take 1 tablet tid x3d then 1 tab bid x3d then 1 tab qd x3d

## 2025-03-25 ENCOUNTER — HOSPITAL ENCOUNTER (EMERGENCY)
Facility: HOSPITAL | Age: 35
Discharge: HOME | End: 2025-03-25
Payer: COMMERCIAL

## 2025-03-25 ENCOUNTER — APPOINTMENT (EMERGENCY)
Dept: CT IMAGING | Facility: HOSPITAL | Age: 35
End: 2025-03-25
Payer: COMMERCIAL

## 2025-03-25 VITALS
TEMPERATURE: 97.9 F | HEART RATE: 60 BPM | RESPIRATION RATE: 18 BRPM | DIASTOLIC BLOOD PRESSURE: 61 MMHG | SYSTOLIC BLOOD PRESSURE: 114 MMHG | OXYGEN SATURATION: 96 % | BODY MASS INDEX: 35.25 KG/M2 | WEIGHT: 202.16 LBS

## 2025-03-25 DIAGNOSIS — N39.0 UTI (URINARY TRACT INFECTION): ICD-10-CM

## 2025-03-25 DIAGNOSIS — R10.9 ABDOMINAL PAIN: Primary | ICD-10-CM

## 2025-03-25 LAB
ALBUMIN SERPL BCG-MCNC: 4.1 G/DL (ref 3.5–5)
ALP SERPL-CCNC: 62 U/L (ref 34–104)
ALT SERPL W P-5'-P-CCNC: 18 U/L (ref 7–52)
ANION GAP SERPL CALCULATED.3IONS-SCNC: 5 MMOL/L (ref 4–13)
AST SERPL W P-5'-P-CCNC: 19 U/L (ref 13–39)
BACTERIA UR QL AUTO: ABNORMAL /HPF
BASOPHILS # BLD AUTO: 0.03 THOUSANDS/ÂΜL (ref 0–0.1)
BASOPHILS NFR BLD AUTO: 0 % (ref 0–1)
BILIRUB SERPL-MCNC: 0.54 MG/DL (ref 0.2–1)
BILIRUB UR QL STRIP: NEGATIVE
BUN SERPL-MCNC: 11 MG/DL (ref 5–25)
CALCIUM SERPL-MCNC: 8.6 MG/DL (ref 8.4–10.2)
CHLORIDE SERPL-SCNC: 107 MMOL/L (ref 96–108)
CLARITY UR: ABNORMAL
CO2 SERPL-SCNC: 26 MMOL/L (ref 21–32)
COLOR UR: ABNORMAL
CREAT SERPL-MCNC: 0.5 MG/DL (ref 0.6–1.3)
EOSINOPHIL # BLD AUTO: 0.06 THOUSAND/ÂΜL (ref 0–0.61)
EOSINOPHIL NFR BLD AUTO: 1 % (ref 0–6)
ERYTHROCYTE [DISTWIDTH] IN BLOOD BY AUTOMATED COUNT: 13.2 % (ref 11.6–15.1)
EXT PREGNANCY TEST URINE: NEGATIVE
EXT. CONTROL: NORMAL
GFR SERPL CREATININE-BSD FRML MDRD: 124 ML/MIN/1.73SQ M
GLUCOSE SERPL-MCNC: 85 MG/DL (ref 65–140)
GLUCOSE UR STRIP-MCNC: NEGATIVE MG/DL
HCT VFR BLD AUTO: 42.3 % (ref 34.8–46.1)
HGB BLD-MCNC: 13.7 G/DL (ref 11.5–15.4)
HGB UR QL STRIP.AUTO: NEGATIVE
IMM GRANULOCYTES # BLD AUTO: 0.03 THOUSAND/UL (ref 0–0.2)
IMM GRANULOCYTES NFR BLD AUTO: 0 % (ref 0–2)
KETONES UR STRIP-MCNC: NEGATIVE MG/DL
LACTATE SERPL-SCNC: 1.6 MMOL/L (ref 0.5–2)
LEUKOCYTE ESTERASE UR QL STRIP: ABNORMAL
LIPASE SERPL-CCNC: 10 U/L (ref 11–82)
LYMPHOCYTES # BLD AUTO: 2.57 THOUSANDS/ÂΜL (ref 0.6–4.47)
LYMPHOCYTES NFR BLD AUTO: 28 % (ref 14–44)
MCH RBC QN AUTO: 28.6 PG (ref 26.8–34.3)
MCHC RBC AUTO-ENTMCNC: 32.4 G/DL (ref 31.4–37.4)
MCV RBC AUTO: 88 FL (ref 82–98)
MONOCYTES # BLD AUTO: 0.65 THOUSAND/ÂΜL (ref 0.17–1.22)
MONOCYTES NFR BLD AUTO: 7 % (ref 4–12)
NEUTROPHILS # BLD AUTO: 5.94 THOUSANDS/ÂΜL (ref 1.85–7.62)
NEUTS SEG NFR BLD AUTO: 64 % (ref 43–75)
NITRITE UR QL STRIP: NEGATIVE
NON-SQ EPI CELLS URNS QL MICRO: ABNORMAL /HPF
NRBC BLD AUTO-RTO: 0 /100 WBCS
PH UR STRIP.AUTO: 6.5 [PH]
PLATELET # BLD AUTO: 266 THOUSANDS/UL (ref 149–390)
PMV BLD AUTO: 10.7 FL (ref 8.9–12.7)
POTASSIUM SERPL-SCNC: 4.4 MMOL/L (ref 3.5–5.3)
PROT SERPL-MCNC: 7 G/DL (ref 6.4–8.4)
PROT UR STRIP-MCNC: NEGATIVE MG/DL
RBC # BLD AUTO: 4.79 MILLION/UL (ref 3.81–5.12)
RBC #/AREA URNS AUTO: ABNORMAL /HPF
SODIUM SERPL-SCNC: 138 MMOL/L (ref 135–147)
SP GR UR STRIP.AUTO: 1.01 (ref 1–1.03)
UROBILINOGEN UR STRIP-ACNC: <2 MG/DL
WBC # BLD AUTO: 9.28 THOUSAND/UL (ref 4.31–10.16)
WBC #/AREA URNS AUTO: ABNORMAL /HPF

## 2025-03-25 PROCEDURE — 81025 URINE PREGNANCY TEST: CPT

## 2025-03-25 PROCEDURE — 85025 COMPLETE CBC W/AUTO DIFF WBC: CPT

## 2025-03-25 PROCEDURE — 81001 URINALYSIS AUTO W/SCOPE: CPT

## 2025-03-25 PROCEDURE — 80053 COMPREHEN METABOLIC PANEL: CPT

## 2025-03-25 PROCEDURE — 99284 EMERGENCY DEPT VISIT MOD MDM: CPT

## 2025-03-25 PROCEDURE — 99285 EMERGENCY DEPT VISIT HI MDM: CPT

## 2025-03-25 PROCEDURE — 83690 ASSAY OF LIPASE: CPT

## 2025-03-25 PROCEDURE — 83605 ASSAY OF LACTIC ACID: CPT

## 2025-03-25 PROCEDURE — 36415 COLL VENOUS BLD VENIPUNCTURE: CPT

## 2025-03-25 PROCEDURE — 74177 CT ABD & PELVIS W/CONTRAST: CPT

## 2025-03-25 RX ORDER — ONDANSETRON 2 MG/ML
4 INJECTION INTRAMUSCULAR; INTRAVENOUS ONCE
Status: DISCONTINUED | OUTPATIENT
Start: 2025-03-25 | End: 2025-03-25 | Stop reason: HOSPADM

## 2025-03-25 RX ORDER — ONDANSETRON 4 MG/1
4 TABLET, ORALLY DISINTEGRATING ORAL EVERY 6 HOURS PRN
Qty: 20 TABLET | Refills: 0 | Status: SHIPPED | OUTPATIENT
Start: 2025-03-25

## 2025-03-25 RX ORDER — CEFPODOXIME PROXETIL 200 MG/1
200 TABLET, FILM COATED ORAL 2 TIMES DAILY
Qty: 20 TABLET | Refills: 0 | Status: SHIPPED | OUTPATIENT
Start: 2025-03-25 | End: 2025-04-04

## 2025-03-25 RX ADMIN — IOHEXOL 100 ML: 350 INJECTION, SOLUTION INTRAVENOUS at 14:49

## 2025-03-25 NOTE — Clinical Note
Angela Fu was seen and treated in our emergency department on 3/25/2025.                Diagnosis:     Angela  may return to work on return date.    She may return on this date: 03/26/2025         If you have any questions or concerns, please don't hesitate to call.      LEN Sanches    ______________________________           _______________          _______________  Hospital Representative                              Date                                Time

## 2025-03-25 NOTE — ED PROVIDER NOTES
Time reflects when diagnosis was documented in both MDM as applicable and the Disposition within this note       Time User Action Codes Description Comment    3/25/2025  4:04 PM Kanika Cates Add [R10.9] Abdominal pain     3/25/2025  4:04 PM Kanika Cates Add [N39.0] UTI (urinary tract infection)           ED Disposition       ED Disposition   Discharge    Condition   Stable    Date/Time   Tue Mar 25, 2025  4:04 PM    Comment   Angela Fu discharge to home/self care.                   Assessment & Plan       Medical Decision Making  34-year-old female patient presents the ER for evaluation of abdominal pain.  Patient states that she has been having abdominal pain and cramping on the right side for approximately 2 to 3 weeks.  Patient states that over the last 2 to 3 weeks she was seen and evaluated at OB/GYN which recommended pelvic ultrasound.  Patient stated that she her insurance denied a pelvic ultrasound.  She reports that over the last 24 hours the right sided abdominal pain has gotten more intense.  She reports that she has generalized abdominal pain although now is worse in the right lower quadrant and suprapubic.  She does feel urgency and pressure in her suprapubic region specifically with urination.  Patient denies back pain, fever, chills or nausea or vomiting.    Abdominal exam is without peritoneal signs.  No evidence of acute abdomen at this time.  Well-appearing.  Given workup, low suspicion for acute biliary disease, acute pancreatitis, acute infectious process to include but not limited to pneumonia, pyelonephritis, appendicitis.  Presentation not consistent with other acute, emergent causes for abdominal pain at this time.  CBC shows no leukocytosis, anemia.  CMP negative for metabolic derangements.  Lipase negative for pancreatitis.  CT abdomen pelvis did not show any acute abnormalities.  Patient does have bacteria in her urine and due to suprapubic pain patient will be treated for UTI.   Although less likely due to abdominal pain of 2 to 3 weeks.  Patient may have a contaminated collection and urine was sent off to culture.  Advised to follow-up with gastroenterology.  Return to the ER symptoms worsens or questions or concerns arise at home.      Amount and/or Complexity of Data Reviewed  Labs: ordered.  Radiology: ordered. Decision-making details documented in ED Course.    Risk  Prescription drug management.        ED Course as of 03/25/25 1643   Tue Mar 25, 2025   1602 CT abdomen pelvis with contrast  No acute findings.       Medications   ondansetron (ZOFRAN) injection 4 mg (4 mg Intravenous Not Given 3/25/25 1404)   iohexol (OMNIPAQUE) 350 MG/ML injection (MULTI-DOSE) 100 mL (100 mL Intravenous Given 3/25/25 1449)       ED Risk Strat Scores                            SBIRT 20yo+      Flowsheet Row Most Recent Value   Initial Alcohol Screen: US AUDIT-C     1. How often do you have a drink containing alcohol? 3 Filed at: 03/25/2025 1324   2. How many drinks containing alcohol do you have on a typical day you are drinking?  1 Filed at: 03/25/2025 1324   3b. FEMALE Any Age, or MALE 65+: How often do you have 4 or more drinks on one occassion? 0 Filed at: 03/25/2025 1324   Audit-C Score 4 Filed at: 03/25/2025 1324   CARINA: How many times in the past year have you...    Used an illegal drug or used a prescription medication for non-medical reasons? Never Filed at: 03/25/2025 1324                            History of Present Illness       Chief Complaint   Patient presents with    Abdominal Pain     Pt presents to ER from home for reports of abd pain and cramps on R side x 2-3 weeks. Pt states it got more intense last night. Pt reports feeling pressure in RLQ with urination.        Past Medical History:   Diagnosis Date    Anxiety     Blood type A+     Depression     Herpes     High blood pressure     High cholesterol     Hypertension     Migraine 2016    Varicella       Past Surgical History:  "  Procedure Laterality Date    BIOPSY CORE NEEDLE      INDUCED   2010    by [facility]     TUBAL LIGATION      US GUIDED BREAST BIOPSY RIGHT COMPLETE Right 2019    WISDOM TOOTH EXTRACTION        Family History   Problem Relation Age of Onset    Breast cancer Mother 28    Lung cancer Mother 28    Hypertension Father     Hyperlipidemia Father     No Known Problems Sister     No Known Problems Sister     No Known Problems Brother     No Known Problems Maternal Grandmother     Stroke Maternal Grandfather     Heart attack Maternal Grandfather     Heart failure Maternal Grandfather     Hypertension Maternal Grandfather     Aortic stenosis Maternal Grandfather     No Known Problems Paternal Grandmother     Heart failure Paternal Grandfather     No Known Problems Daughter     No Known Problems Daughter     Asthma Son     ADD / ADHD Son     Cancer Paternal Aunt     Breast cancer Paternal Aunt         age dx unknown    No Known Problems Paternal Aunt     No Known Problems Paternal Aunt     Breast cancer Other 40    Asthma Other       Social History     Tobacco Use    Smoking status: Some Days     Current packs/day: 0.50     Average packs/day: 0.7 packs/day for 22.5 years (15.0 ttl pk-yrs)     Types: Cigarettes    Smokeless tobacco: Never    Tobacco comments:     \"trying to quit\" 2021   Vaping Use    Vaping status: Former    Substances: THC, CBD   Substance Use Topics    Alcohol use: Yes     Comment: occas.    Drug use: Yes     Types: Marijuana     Comment: Medical card      E-Cigarette/Vaping    E-Cigarette Use Former User       E-Cigarette/Vaping Substances    Nicotine No     THC Yes     CBD Yes     Flavoring No     Other No     Unknown No       I have reviewed and agree with the history as documented.     33 y/o female patient presents to the ER for evaluation of abdominal pain.         Review of Systems   Constitutional:  Negative for chills and fever.   HENT:  Negative for ear pain and sore throat.    Eyes:  " Negative for pain and visual disturbance.   Respiratory:  Negative for cough and shortness of breath.    Cardiovascular:  Negative for chest pain and palpitations.   Gastrointestinal:  Positive for abdominal pain. Negative for vomiting.   Genitourinary:  Positive for dysuria. Negative for hematuria.   Musculoskeletal:  Negative for arthralgias and back pain.   Skin:  Negative for color change and rash.   Neurological:  Negative for seizures and syncope.   All other systems reviewed and are negative.          Objective       ED Triage Vitals   Temperature Pulse Blood Pressure Respirations SpO2 Patient Position - Orthostatic VS   03/25/25 1322 03/25/25 1322 03/25/25 1322 03/25/25 1603 03/25/25 1322 03/25/25 1322   (!) 97.4 °F (36.3 °C) 62 134/65 18 100 % Sitting      Temp Source Heart Rate Source BP Location FiO2 (%) Pain Score    03/25/25 1322 03/25/25 1322 03/25/25 1322 -- 03/25/25 1323    Temporal Monitor Left arm  7      Vitals      Date and Time Temp Pulse SpO2 Resp BP Pain Score FACES Pain Rating User   03/25/25 1603 97.9 °F (36.6 °C) 60 96 % 18 114/61 -- -- AT   03/25/25 1323 -- -- -- -- -- 7 -- AM   03/25/25 1322 97.4 °F (36.3 °C) 62 100 % -- 134/65 -- --             Physical Exam  Vitals and nursing note reviewed.   Constitutional:       General: She is not in acute distress.     Appearance: She is well-developed.   HENT:      Head: Normocephalic and atraumatic.   Eyes:      Conjunctiva/sclera: Conjunctivae normal.   Cardiovascular:      Rate and Rhythm: Normal rate and regular rhythm.      Heart sounds: Normal heart sounds.   Pulmonary:      Effort: Pulmonary effort is normal. No respiratory distress.      Breath sounds: Normal breath sounds.   Abdominal:      Palpations: Abdomen is soft.      Tenderness: There is abdominal tenderness in the right lower quadrant.   Musculoskeletal:         General: No swelling.      Cervical back: Neck supple.   Skin:     General: Skin is warm and dry.      Capillary  Refill: Capillary refill takes less than 2 seconds.   Neurological:      Mental Status: She is alert and oriented to person, place, and time.   Psychiatric:         Mood and Affect: Mood normal.         Behavior: Behavior normal.         Results Reviewed       Procedure Component Value Units Date/Time    Comprehensive metabolic panel [[id number]]  (Abnormal) Collected: 03/25/25 1404    Lab Status: Final result Specimen: Blood from Arm, Right Updated: 03/25/25 1429     Sodium 138 mmol/L      Potassium 4.4 mmol/L      Chloride 107 mmol/L      CO2 26 mmol/L      ANION GAP 5 mmol/L      BUN 11 mg/dL      Creatinine 0.50 mg/dL      Glucose 85 mg/dL      Calcium 8.6 mg/dL      AST 19 U/L      ALT 18 U/L      Alkaline Phosphatase 62 U/L      Total Protein 7.0 g/dL      Albumin 4.1 g/dL      Total Bilirubin 0.54 mg/dL      eGFR 124 ml/min/1.73sq m     Narrative:      National Kid[employer] Foundation guidelines for Chronic Kidney Disease (CKD):     Stage 1 with normal or high GFR (GFR > 90 mL/min/1.73 square meters)    Stage 2 Mild CKD (GFR = 60-89 mL/min/1.73 square meters)    Stage 3A Moderate CKD (GFR = 45-59 mL/min/1.73 square meters)    Stage 3B Moderate CKD (GFR = 30-44 mL/min/1.73 square meters)    Stage 4 Severe CKD (GFR = 15-29 mL/min/1.73 square meters)    Stage 5 End Stage CKD (GFR <15 mL/min/1.73 square meters)  Note: GFR calculation is accurate only with a steady state creatinine    Lipase [[id number]]  (Abnormal) Collected: 03/25/25 1404    Lab Status: Final result Specimen: Blood from Arm, Right Updated: 03/25/25 1429     Lipase 10 u/L     Lactic acid, plasma (w/reflex if result > 2.0) [[id number]]  (Normal) Collected: 03/25/25 1404    Lab Status: Final result Specimen: Blood from Arm, Right Updated: 03/25/25 1428     LACTIC ACID 1.6 mmol/L     Narrative:      Result may be elevated if tourniquet was used during collection.    CBC and differential [[id number]] Collected: 03/25/25 1404    Lab Status: Final result  Specimen: Blood from Arm, Right Updated: 03/25/25 1412     WBC 9.28 Thousand/uL      RBC 4.79 Million/uL      Hemoglobin 13.7 g/dL      Hematocrit 42.3 %      MCV 88 fL      MCH 28.6 pg      MCHC 32.4 g/dL      RDW 13.2 %      MPV 10.7 fL      Platelets 266 Thousands/uL      nRBC 0 /100 WBCs      Segmented % 64 %      Immature Grans % 0 %      Lymphocytes % 28 %      Monocytes % 7 %      Eosinophils Relative 1 %      Basophils Relative 0 %      Absolute Neutrophils 5.94 Thousands/µL      Absolute Immature Grans 0.03 Thousand/uL      Absolute Lymphocytes 2.57 Thousands/µL      Absolute Monocytes 0.65 Thousand/µL      Eosinophils Absolute 0.06 Thousand/µL      Basophils Absolute 0.03 Thousands/µL     POCT pregnancy, urine [[id number]]  (Normal) Collected: 03/25/25 1404    Lab Status: Final result Updated: 03/25/25 1404     EXT Preg Test, Ur Negative     Control Valid    Urine Microscopic [[id number]]  (Abnormal) Collected: 03/25/25 1351    Lab Status: Final result Specimen: Urine, Clean Catch Updated: 03/25/25 1403     RBC, UA 1-2 /hpf      WBC, UA 10-20 /hpf      Epithelial Cells Innumerable /hpf      Bacteria, UA Innumerable /hpf     UA (URINE) with reflex to Scope [[id number]]  (Abnormal) Collected: 03/25/25 1351    Lab Status: Final result Specimen: Urine, Clean Catch Updated: 03/25/25 1401     Color, UA Light Yellow     Clarity, UA Turbid     Specific Gravity, UA 1.007     pH, UA 6.5     Leukocytes, UA Small     Nitrite, UA Negative     Protein, UA Negative mg/dl      Glucose, UA Negative mg/dl      Ketones, UA Negative mg/dl      Urobilinogen, UA <2.0 mg/dl      Bilirubin, UA Negative     Occult Blood, UA Negative            CT abdomen pelvis with contrast   Final Interpretation by Junito Nova MD (03/25 1553)      No acute findings.         Workstation performed: [id number]             Procedures    ED Medication and Procedure Management   Prior to Admission Medications   Prescriptions Last Dose  Informant Patient Reported? Taking?   Cholecalciferol (Dialyvite Vitamin D 5000) 125 MCG (5000 UT) capsule   No No   Sig: Take 1 cap 2 times a day x 3 months then 1 cap daily   betamethasone dipropionate (DIPROSONE) 0.05 % cream   No No   Sig: Apply topically 2 (two) times a day   calcipotriene (DOVONOX) 0.005 % ointment   No No   Sig: Apply topically 2 (two) times a day   cyanocobalamin (VITAMIN B-12) 500 MCG tablet   No No   Sig: Take 1 tablet (500 mcg total) by mouth daily   diazepam (VALIUM) 10 mg tablet   Yes No   Sig: TAKE 1 TABLET BY MOUTH 1 HR BEFORE APPT   omeprazole (PriLOSEC) 40 MG capsule   No No   Sig: TAKE 1 CAPSULE (40 MG TOTAL) BY MOUTH DAILY.   predniSONE 10 mg tablet   No No   Sig: Take 1 tablet tid x3d then 1 tab bid x3d then 1 tab qd x3d   tiZANidine (ZANAFLEX) 2 mg tablet   No No   Sig: TAKE 1 TABLET BY MOUTH DAILY AT BEDTIME   valACYclovir (VALTREX) 1,000 mg tablet   No No   Sig: Take 1 tablet (1,000 mg total) by mouth daily      Facility-Administered Medications: None     Discharge Medication List as of 3/25/2025  4:05 PM        START taking these medications    Details   cefpodoxime (VANTIN) 200 mg tablet Take 1 tablet (200 mg total) by mouth 2 (two) times a day for 10 days, Starting Tue 3/25/2025, Until Fri 4/4/2025, Normal      ondansetron (ZOFRAN-ODT) 4 mg disintegrating tablet Take 1 tablet (4 mg total) by mouth every 6 (six) hours as needed for nausea or vomiting, Starting Tue 3/25/2025, Normal           CONTINUE these medications which have NOT CHANGED    Details   betamethasone dipropionate (DIPROSONE) 0.05 % cream Apply topically 2 (two) times a day, Starting Fri 8/23/2024, Normal      calcipotriene (DOVONOX) 0.005 % ointment Apply topically 2 (two) times a day, Starting Fri 8/23/2024, Normal      Cholecalciferol (Dialyvite Vitamin D 5000) 125 MCG (5000 UT) capsule Take 1 cap 2 times a day x 3 months then 1 cap daily, Normal      cyanocobalamin (VITAMIN B-12) 500 MCG tablet Take 1  tablet (500 mcg total) by mouth daily, Starting Fri 1/24/2025, Normal      diazepam (VALIUM) 10 mg tablet TAKE 1 TABLET BY MOUTH 1 HR BEFORE APPT, Historical Med      omeprazole (PriLOSEC) 40 MG capsule TAKE 1 CAPSULE (40 MG TOTAL) BY MOUTH DAILY., Starting Tue 2/11/2025, Normal      predniSONE 10 mg tablet Take 1 tablet tid x3d then 1 tab bid x3d then 1 tab qd x3d, Normal      tiZANidine (ZANAFLEX) 2 mg tablet TAKE 1 TABLET BY MOUTH DAILY AT BEDTIME, Starting Tue 2/11/2025, Normal      valACYclovir (VALTREX) 1,000 mg tablet Take 1 tablet (1,000 mg total) by mouth daily, Starting Fri 1/17/2025, Until Sat 1/17/2026, Normal           No discharge procedures on file.  ED SEPSIS DOCUMENTATION   Time reflects when diagnosis was documented in both MDM as applicable and the Disposition within this note       Time User Action Codes Description Comment    3/25/2025  4:04 PM Kanika Cates Add [R10.9] Abdominal pain     3/25/2025  4:04 PM Kanika Cates [N39.0] UTI (urinary tract infection)                  LEN Sanches  03/25/25 7957

## 2025-03-25 NOTE — DISCHARGE INSTRUCTIONS
Tylenol/Motrin for pain  Vantin- take with food  Zofran-every 6 hours as needed for nausea/vomiting  Follow up with primary care provider  Return to ER if symptoms worsen

## 2025-06-18 ENCOUNTER — TELEPHONE (OUTPATIENT)
Age: 35
End: 2025-06-18

## 2025-06-18 NOTE — TELEPHONE ENCOUNTER
Patient has small firm lump bottom of middle finger on left hand which impedes ability to use her left hand in day-to-day activities.  The hand is painful when lump is touched.  Tried to schedule F/U but nothing available.  Patient asked if she could go for x-ray and if someone could then find availability for her to see someone at practice.

## 2025-06-19 DIAGNOSIS — M79.645 PAIN OF FINGER OF LEFT HAND: Primary | ICD-10-CM

## 2025-06-23 ENCOUNTER — APPOINTMENT (OUTPATIENT)
Dept: RADIOLOGY | Facility: MEDICAL CENTER | Age: 35
End: 2025-06-23
Payer: COMMERCIAL

## 2025-06-23 DIAGNOSIS — M79.645 PAIN OF FINGER OF LEFT HAND: ICD-10-CM

## 2025-06-23 PROCEDURE — 73130 X-RAY EXAM OF HAND: CPT

## 2025-06-25 ENCOUNTER — RESULTS FOLLOW-UP (OUTPATIENT)
Dept: FAMILY MEDICINE CLINIC | Facility: CLINIC | Age: 35
End: 2025-06-25

## 2025-06-25 RX ORDER — CEFPODOXIME PROXETIL 200 MG/1
1 TABLET, FILM COATED ORAL 2 TIMES DAILY
COMMUNITY
Start: 2025-04-09 | End: 2025-06-26

## 2025-06-25 NOTE — TELEPHONE ENCOUNTER
----- Message from Parish Chavez MD sent at 6/25/2025  5:55 AM EDT -----  Hard to tell what it is   Come see Anglea here in my office and we can decide if hand surgery or rheum or whatever is appropriate   ----- Message -----  From: Interface, Radiology Results In  Sent: 6/23/2025   9:08 AM EDT  To: Parish Chavez MD     finger

## 2025-06-26 ENCOUNTER — OFFICE VISIT (OUTPATIENT)
Dept: FAMILY MEDICINE CLINIC | Facility: CLINIC | Age: 35
End: 2025-06-26
Payer: COMMERCIAL

## 2025-06-26 VITALS
HEART RATE: 75 BPM | DIASTOLIC BLOOD PRESSURE: 80 MMHG | SYSTOLIC BLOOD PRESSURE: 126 MMHG | OXYGEN SATURATION: 98 % | BODY MASS INDEX: 34.83 KG/M2 | HEIGHT: 64 IN | WEIGHT: 204 LBS

## 2025-06-26 DIAGNOSIS — G43.C0 PERIODIC HEADACHE SYNDROME, NOT INTRACTABLE: ICD-10-CM

## 2025-06-26 DIAGNOSIS — R22.32 NODULE OF FINGER OF LEFT HAND: ICD-10-CM

## 2025-06-26 DIAGNOSIS — Z87.19 HISTORY OF DENTAL ABSCESS: ICD-10-CM

## 2025-06-26 DIAGNOSIS — N30.00 ACUTE CYSTITIS WITHOUT HEMATURIA: Primary | ICD-10-CM

## 2025-06-26 DIAGNOSIS — L40.50 PSORIATIC ARTHRITIS (HCC): ICD-10-CM

## 2025-06-26 PROCEDURE — 99214 OFFICE O/P EST MOD 30 MIN: CPT | Performed by: FAMILY MEDICINE

## 2025-06-26 RX ORDER — LEVOFLOXACIN 500 MG/1
500 TABLET, FILM COATED ORAL EVERY 24 HOURS
Qty: 7 TABLET | Refills: 0 | Status: SHIPPED | OUTPATIENT
Start: 2025-06-26 | End: 2025-07-03

## 2025-06-26 NOTE — PROGRESS NOTES
Name: Angela Fu      : 1990      MRN: [id number]  Encounter Provider: Parish Chavez MD  Encounter Date: 2025   Encounter department: [facility]    :  Assessment & Plan  Acute cystitis without hematuria    Orders:  •  levofloxacin (LEVAQUIN) 500 mg tablet; Take 1 tablet (500 mg total) by mouth every 24 hours for 7 days  •  UA w Reflex to Microscopic w Reflex to Culture; Future  •  Urine culture; Future    Nodule of finger of left hand    Orders:  •  Ambulatory Referral to Orthopedic Surgery; Future    Psoriatic arthritis (HCC)         Periodic headache syndrome, not intractable           History of dental abscess           Assessment & Plan  1. Mild hepatomegaly.  - Likely due to fatty infiltration, which is reversible with dietary modifications.  - Advised to incorporate more salads into the diet.  - Consider taking digestive enzymes with meals to aid in food breakdown.  - No signs of liver failure or severe liver disease.    2. Kidney cyst.  - A cyst was identified in the kidney, which is a common finding.  - No immediate treatment is necessary.  - Kidney function tests are normal.  - No symptoms or complications related to the cyst.    3. Bacteriuria.  - Presence of bacteria in the urine suggests colonization rather than infection.  - No symptoms indicative of a urinary tract infection.  - Prescription for Levaquin 500 mg, to be taken once daily for 7 days.  - Repeat urinalysis and urine culture to be conducted 2 to 3 days after completing the antibiotic course.    4. Vitamin D deficiency.  - Vitamin D levels were significantly low in 2025.  - Advised to take vitamin D3 supplements at a dosage of 10,000 units daily for a duration of 3 months.  - Previous prescription was for 50,000 units once weekly, now adjusted to daily dosing.  - Importance of daily absorption emphasized.    5. Lipoma.  - Identified on the arm, benign and does not require immediate intervention.  -  Monitored for changes or discomfort.  - Surgical removal considered if it causes significant discomfort or functional impairment.  - No signs of malignancy.    6. Nodule on left hand.  - Noted causing discomfort when pressure is applied.  - Referral to a hand surgeon for further evaluation and potential removal.  - No fracture or severe injury noted on x-ray.  - Plan for surgical intervention to alleviate discomfort.           History of Present Illness     History of Present Illness  The patient presents for evaluation of mild hepatomegaly, kidney cyst, bacteriuria, vitamin D deficiency, and lipoma.    She underwent an MRI approximately 2 months ago due to gastrointestinal disturbances, which revealed a slightly enlarged liver. She is curious about the potential causes of this condition, including dietary factors and alcohol consumption. She is also interested in understanding if this condition is reversible.    A kidney cyst was identified during her previous medical evaluation. She reports a foamy or bubbly appearance in her urine and mentions that bacteria were found in her urine. She was prescribed cefpodoxime but has not started the medication due to concerns about potential interactions with her current medications and their impact on her kidneys or liver.    She has a small lump on her back, which she believes is affecting the muscle in her arm. She once sought emergency care due to severe arm pain, fearing a heart attack. However, she now attributes the discomfort to muscle fatigue or pressure, rather than pain. She is uncertain if this could be related to nerve issues in her finger.    She has not yet consulted a rheumatologist. She has been attempting to schedule an appointment with a dentist for an abscessed tooth but has been unsuccessful in reaching them. She suspects that the tooth may need to be extracted due to the infection. She has [employer] insurance and is concerned about the cost of specialist  "consultations.    She was previously prescribed vitamin D3 at a dosage of 1500 units per week but has been unable to find this over-the-counter supplement.     Review of Systems   Constitutional:  Negative for fever and unexpected weight change.   HENT:  Negative for nosebleeds and trouble swallowing.    Eyes:  Negative for visual disturbance.   Respiratory:  Negative for chest tightness and shortness of breath.    Cardiovascular:  Negative for chest pain, palpitations and leg swelling.   Gastrointestinal:  Negative for abdominal pain, constipation, diarrhea and nausea.   Endocrine: Negative for cold intolerance.   Genitourinary:  Negative for dysuria and urgency.   Musculoskeletal:  Negative for joint swelling and myalgias.   Skin:  Negative for rash.   Neurological:  Negative for tremors, seizures and syncope.   Hematological:  Does not bruise/bleed easily.   Psychiatric/Behavioral:  Negative for hallucinations and suicidal ideas.      Objective   /80   Pulse 75   Ht 5' 3.5\" (1.613 m)   Wt 92.5 kg (204 lb)   SpO2 98%   BMI 35.57 kg/m²     Physical Exam  - Musculoskeletal:    - Nodule on left hand    - Lipoma on arm  Physical Exam  Vitals and nursing note reviewed.   Constitutional:       Appearance: She is well-developed.   HENT:      Head: Normocephalic and atraumatic.      Right Ear: External ear normal.      Left Ear: External ear normal.      Nose: Nose normal.     Eyes:      Conjunctiva/sclera: Conjunctivae normal.      Pupils: Pupils are equal, round, and reactive to light.       Cardiovascular:      Rate and Rhythm: Normal rate and regular rhythm.      Heart sounds: Normal heart sounds. No murmur heard.  Pulmonary:      Effort: Pulmonary effort is normal.      Breath sounds: Normal breath sounds. No wheezing.   Abdominal:      General: Bowel sounds are normal.      Palpations: Abdomen is soft.     Musculoskeletal:         General: No tenderness. Normal range of motion.      Cervical back: Normal " range of motion and neck supple.   Lymphadenopathy:      Cervical: No cervical adenopathy.     Skin:     General: Skin is warm and dry.      Capillary Refill: Capillary refill takes less than 2 seconds.     Neurological:      Mental Status: She is alert and oriented to person, place, and time.     Psychiatric:         Behavior: Behavior normal.         Thought Content: Thought content normal.         Judgment: Judgment normal.

## 2025-06-26 NOTE — ASSESSMENT & PLAN NOTE
{If prescribing CGRP gepants (Nurtec, Ubrelvy, Qulipta) or monoclonal antibodies (Emagality, Ajovy, Aimovig) that currently do not have a prior auth on file, click here to fill out prior auth smartform and then hit F2 with this smartlist to insert prior auth documentation (Optional):[id number]}

## 2025-06-27 ENCOUNTER — OFFICE VISIT (OUTPATIENT)
Dept: OBGYN CLINIC | Facility: CLINIC | Age: 35
End: 2025-06-27
Payer: COMMERCIAL

## 2025-06-27 ENCOUNTER — PREP FOR PROCEDURE (OUTPATIENT)
Dept: OBGYN CLINIC | Facility: CLINIC | Age: 35
End: 2025-06-27

## 2025-06-27 VITALS — HEIGHT: 64 IN | BODY MASS INDEX: 34.83 KG/M2 | WEIGHT: 204 LBS

## 2025-06-27 DIAGNOSIS — R22.32 MASS OF LEFT HAND: Primary | ICD-10-CM

## 2025-06-27 DIAGNOSIS — R22.32 NODULE OF FINGER OF LEFT HAND: ICD-10-CM

## 2025-06-27 PROCEDURE — 99203 OFFICE O/P NEW LOW 30 MIN: CPT | Performed by: ORTHOPAEDIC SURGERY

## 2025-06-27 RX ORDER — CHLORHEXIDINE GLUCONATE ORAL RINSE 1.2 MG/ML
15 SOLUTION DENTAL ONCE
OUTPATIENT
Start: 2025-06-27 | End: 2025-06-27

## 2025-06-27 RX ORDER — SODIUM CHLORIDE, SODIUM LACTATE, POTASSIUM CHLORIDE, CALCIUM CHLORIDE 600; 310; 30; 20 MG/100ML; MG/100ML; MG/100ML; MG/100ML
20 INJECTION, SOLUTION INTRAVENOUS CONTINUOUS
OUTPATIENT
Start: 2025-06-27

## 2025-06-27 RX ORDER — CHLORHEXIDINE GLUCONATE 40 MG/ML
SOLUTION TOPICAL DAILY PRN
OUTPATIENT
Start: 2025-06-27

## 2025-06-27 NOTE — PROGRESS NOTES
Assessment & Plan  Nodule of finger of left hand    Orders:    Ambulatory Referral to Orthopedic Surgery    Mass of left hand  X-rays of the patient's left hand are negative for any fractures or dislocations.  She does have a foreign body in along her 2nd and 3rd fingers.  We are unsure if this is what is causing the superficial mass.  The patient states that this mass is affecting her quality of life.  The risks and benefits of surgery were discussed with the patient.  She decided on and elective removal of a mass of her left hand.  The patient's surgery is tentatively scheduled for July 23, 2025.  Orders:    Case request operating room: EXCISION MASS LEFT HAND; Standing          The patient has a mass at the base of her left middle finger.  It is unclear whether this is a foreign body which does show it on 1 x-ray view.  Regardless, she wants an excision and biopsy.  All risk, complications, and benefits were discussed with the patient in great detail including bleeding, infection, blood clots, pain, stiffness, neurovascular damage, fractures, dislocations, the possibility loss elective surgery, recurrence of mass, the possibility that she would need further adjuvant treatment, etc.  Her surgery scheduled for July 23, 2025    Return for surgery.      _____________________________________________________  CHIEF COMPLAINT:  Chief Complaint   Patient presents with    Left Hand - Pain         SUBJECTIVE:  Angela Fu is a 35 y.o. female who presents to our office complaining of a mass along her left hand.  The patient states she developed this mass 4 to 6 months ago.  She denies any trauma or falls.  She denies any penetrating wounds.  She states that the mass will cause pain and occasional numbness.  She denies any fever or chills.    The following portions of the patient's history were reviewed and updated as appropriate: allergies, current medications, past family history, past medical history, past social  "history, past surgical history and problem list.    PAST MEDICAL HISTORY:  Past Medical History[1]    PAST SURGICAL HISTORY:  Past Surgical History[2]    FAMILY HISTORY:  Family History[3]    SOCIAL HISTORY:  Social History[4]    MEDICATIONS:  Current Medications[5]    ALLERGIES:  Allergies[6]    ROS:  Review of Systems     Constitutional: Negative for fatigue, fever or loss of appetite.   HENT: Negative.    Respiratory: Negative for shortness of breath, dyspnea.    Cardiovascular: Negative for chest pain/tightness.   Gastrointestinal: Negative for abdominal pain, N/V.   Endocrine: Negative for cold/heat intolerance, unexplained weight loss/gain.   Genitourinary: Negative for flank pain, dysuria, hematuria.   Musculoskeletal: Positive for arthralgia   Skin: Negative for rash.    Neurological: Negative for numbness or tingling  Psychiatric/Behavioral: Negative for agitation.  _____________________________________________________  PHYSICAL EXAMINATION:    Height 5' 3.5\" (1.613 m), weight 92.5 kg (204 lb).    Constitutional: Oriented to person, place, and time. Appears well-developed and well-nourished. No distress.   HENT:   Head: Normocephalic.   Eyes: Conjunctivae are normal. Right eye exhibits no discharge. Left eye exhibits no discharge. No scleral icterus.   Cardiovascular: Normal rate.    Pulmonary/Chest: Effort normal.   Neurological: Alert and oriented to person, place, and time.   Skin: Skin is warm and dry. No rash noted. Not diaphoretic. No erythema. No pallor.   Psychiatric: Normal mood and affect. Behavior is normal. Judgment and thought content normal.      MUSCULOSKELETAL EXAMINATION:   Physical Exam  Ortho Exam    Left upper extremity is neurovascularly intact  Fingers are pink and mobile  Compartments are soft  There is a mass between the 2nd and 3rd fingers  Brisk cap refill  Sensation intact  No warmth or erythema  Objective:  BP Readings from Last 1 Encounters:   06/26/25 126/80      Wt Readings " "from Last 1 Encounters:   25 92.5 kg (204 lb)        BMI:   Estimated body mass index is 35.57 kg/m² as calculated from the following:    Height as of this encounter: 5' 3.5\" (1.613 m).    Weight as of this encounter: 92.5 kg (204 lb).        Scribe Attestation      I,:  Lucio Mckeon PA-C am acting as a scribe while in the presence of the attending physician.:       I,:  Mendel Howard DO personally performed the services described in this documentation    as scribed in my presence.:                   [1]   Past Medical History:  Diagnosis Date    Anxiety     Blood type A+     Depression     Herpes     High blood pressure     High cholesterol     Hypertension     Migraine 2016    Varicella    [2]   Past Surgical History:  Procedure Laterality Date    BIOPSY CORE NEEDLE      INDUCED       by [facility]     TUBAL LIGATION      US GUIDED BREAST BIOPSY RIGHT COMPLETE Right 2019    WISDOM TOOTH EXTRACTION     [3]   Family History  Problem Relation Name Age of Onset    Breast cancer Mother Angelica 28    Lung cancer Mother Angelica 28    Hypertension Father Atilio     Hyperlipidemia Father Atilio     No Known Problems Sister      No Known Problems Sister      No Known Problems Brother      No Known Problems Maternal Grandmother      Stroke Maternal Grandfather Renato     Heart attack Maternal Grandfather Renato     Heart failure Maternal Grandfather Renato     Hypertension Maternal Grandfather Renato     Aortic stenosis Maternal Grandfather Renato     No Known Problems Paternal Grandmother      Heart failure Paternal Grandfather Renato     No Known Problems Daughter      No Known Problems Daughter      Asthma Son Messiah Transue     ADD / ADHD Son Messiah Transue     Cancer Paternal Aunt      Breast cancer Paternal Aunt          age dx unknown    No Known Problems Paternal Aunt      No Known Problems Paternal Aunt      Breast cancer Other PGA 40    Asthma Other     [4]   Social " "History  Tobacco Use    Smoking status: Some Days     Current packs/day: 0.50     Average packs/day: 0.7 packs/day for 22.5 years (15.0 ttl pk-yrs)     Types: Cigarettes    Smokeless tobacco: Never    Tobacco comments:     \"trying to quit\" Jan 2021   Vaping Use    Vaping status: Former    Substances: THC, CBD   Substance Use Topics    Alcohol use: Yes     Comment: occas.    Drug use: Yes     Types: Marijuana     Comment: Medical card   [5]   Current Outpatient Medications:     betamethasone dipropionate (DIPROSONE) 0.05 % cream, Apply topically 2 (two) times a day, Disp: 45 g, Rfl: 1    calcipotriene (DOVONOX) 0.005 % ointment, Apply topically 2 (two) times a day, Disp: 60 g, Rfl: 1    Cholecalciferol (Dialyvite Vitamin D 5000) 125 MCG (5000 UT) capsule, Take 1 cap 2 times a day x 3 months then 1 cap daily, Disp: 180 capsule, Rfl: 11    cyanocobalamin (VITAMIN B-12) 500 MCG tablet, Take 1 tablet (500 mcg total) by mouth daily, Disp: 90 tablet, Rfl: 1    diazepam (VALIUM) 10 mg tablet, , Disp: , Rfl:     levofloxacin (LEVAQUIN) 500 mg tablet, Take 1 tablet (500 mg total) by mouth every 24 hours for 7 days, Disp: 7 tablet, Rfl: 0    omeprazole (PriLOSEC) 40 MG capsule, TAKE 1 CAPSULE (40 MG TOTAL) BY MOUTH DAILY., Disp: 90 capsule, Rfl: 0    tiZANidine (ZANAFLEX) 2 mg tablet, TAKE 1 TABLET BY MOUTH DAILY AT BEDTIME, Disp: 90 tablet, Rfl: 0    valACYclovir (VALTREX) 1,000 mg tablet, Take 1 tablet (1,000 mg total) by mouth daily, Disp: 90 tablet, Rfl: 3  [6]   Allergies  Allergen Reactions    Bee Venom Anaphylaxis    Aspirin Other (See Comments)     unknown    Latex     Strawberry Extract - Food Allergy Other (See Comments)     Tingling and itching in throat while eating strawberries    Tramadol      Breathing issues.      "

## 2025-07-14 NOTE — PRE-PROCEDURE INSTRUCTIONS
Pre-Surgery Instructions:   Medication Instructions    betamethasone dipropionate (DIPROSONE) 0.05 % cream Hold day of surgery.    valACYclovir (VALTREX) 1,000 mg tablet Take day of surgery.    Medication instructions for day of surgery reviewed. Patient verbalized understanding and agrees with the plan.  Please take all instructed medications with only a sip of water. Please do not take any over the counter (non-prescribed) vitamins or supplements for one week prior to date of surgery.      You will receive a call one business day prior to surgery with an arrival time and hospital directions. If your surgery is scheduled on a Monday, the hospital will be calling you on the Friday prior to your surgery. If you have not heard from anyone by 8pm, please call the hospital supervisor through the hospital  at [phone]. ([facility] [phone] or [place] [phone]).    Do not eat or drink anything after midnight the night before your surgery, including candy, mints, lifesavers, or chewing gum. Do not drink alcohol 24hrs before your surgery. Try not to smoke at least 24hrs before your surgery.       Follow the pre surgery showering instructions as listed in the “My Surgical Experience Booklet” or otherwise provided by your surgeon's office. Do not use a blade to shave the surgical area 1 week before surgery. It is okay to use a clean electric clippers up to 24 hours before surgery. Do not apply any lotions, creams, including makeup, cologne, deodorant, or perfumes after showering on the day of your surgery. Do not use dry shampoo, hair spray, hair gel, or any type of hair products.     No contact lenses, eye make-up, or artificial eyelashes. Remove nail polish, including gel polish, and any artificial, gel, or acrylic nails if possible. Remove all jewelry including rings and body piercing jewelry.     Wear causal clothing that is easy to take on and off. Consider your type of surgery.    Keep any valuables,  jewelry, piercings at home. Please bring any specially ordered equipment (sling, braces) if indicated.    Arrange for a responsible person to drive you to and from the hospital on the day of your surgery. Please confirm the visitor policy for the day of your procedure when you receive your phone call with an arrival time.     Call the surgeon's office with any new illnesses, exposures, or additional questions prior to surgery.    Please reference your “My Surgical Experience Booklet” for additional information to prepare for your upcoming surgery.

## 2025-07-22 ENCOUNTER — ANESTHESIA EVENT (OUTPATIENT)
Dept: PERIOP | Facility: HOSPITAL | Age: 35
End: 2025-07-22
Payer: COMMERCIAL

## 2025-07-23 ENCOUNTER — HOSPITAL ENCOUNTER (OUTPATIENT)
Facility: HOSPITAL | Age: 35
Setting detail: OUTPATIENT SURGERY
Discharge: HOME | End: 2025-07-23
Attending: ORTHOPAEDIC SURGERY | Admitting: ORTHOPAEDIC SURGERY
Payer: COMMERCIAL

## 2025-07-23 ENCOUNTER — ANESTHESIA (OUTPATIENT)
Dept: PERIOP | Facility: HOSPITAL | Age: 35
End: 2025-07-23
Payer: COMMERCIAL

## 2025-07-23 VITALS
RESPIRATION RATE: 14 BRPM | WEIGHT: 204 LBS | BODY MASS INDEX: 36.14 KG/M2 | DIASTOLIC BLOOD PRESSURE: 70 MMHG | TEMPERATURE: 97.1 F | HEIGHT: 63 IN | OXYGEN SATURATION: 98 % | SYSTOLIC BLOOD PRESSURE: 120 MMHG | HEART RATE: 58 BPM

## 2025-07-23 DIAGNOSIS — R22.32 MASS OF LEFT HAND: ICD-10-CM

## 2025-07-23 LAB
EXT PREGNANCY TEST URINE: NEGATIVE
EXT. CONTROL: NORMAL

## 2025-07-23 PROCEDURE — C1758 CATHETER, URETERAL: HCPCS | Performed by: ORTHOPAEDIC SURGERY

## 2025-07-23 PROCEDURE — 88304 TISSUE EXAM BY PATHOLOGIST: CPT | Performed by: PATHOLOGY

## 2025-07-23 PROCEDURE — 26160 REMOVE TENDON SHEATH LESION: CPT | Performed by: ORTHOPAEDIC SURGERY

## 2025-07-23 PROCEDURE — 81025 URINE PREGNANCY TEST: CPT | Performed by: ORTHOPAEDIC SURGERY

## 2025-07-23 PROCEDURE — 26160 REMOVE TENDON SHEATH LESION: CPT | Performed by: PHYSICIAN ASSISTANT

## 2025-07-23 RX ORDER — ONDANSETRON 2 MG/ML
4 INJECTION INTRAMUSCULAR; INTRAVENOUS ONCE AS NEEDED
Status: DISCONTINUED | OUTPATIENT
Start: 2025-07-23 | End: 2025-07-23 | Stop reason: HOSPADM

## 2025-07-23 RX ORDER — DEXAMETHASONE SODIUM PHOSPHATE 10 MG/ML
INJECTION, SOLUTION INTRAMUSCULAR; INTRAVENOUS AS NEEDED
Status: DISCONTINUED | OUTPATIENT
Start: 2025-07-23 | End: 2025-07-23

## 2025-07-23 RX ORDER — LIDOCAINE HYDROCHLORIDE 10 MG/ML
INJECTION, SOLUTION EPIDURAL; INFILTRATION; INTRACAUDAL; PERINEURAL AS NEEDED
Status: DISCONTINUED | OUTPATIENT
Start: 2025-07-23 | End: 2025-07-23 | Stop reason: HOSPADM

## 2025-07-23 RX ORDER — OXYCODONE HYDROCHLORIDE 5 MG/1
5 TABLET ORAL EVERY 4 HOURS PRN
Status: COMPLETED | OUTPATIENT
Start: 2025-07-23 | End: 2025-07-23

## 2025-07-23 RX ORDER — SODIUM CHLORIDE, SODIUM LACTATE, POTASSIUM CHLORIDE, CALCIUM CHLORIDE 600; 310; 30; 20 MG/100ML; MG/100ML; MG/100ML; MG/100ML
20 INJECTION, SOLUTION INTRAVENOUS CONTINUOUS
Status: DISCONTINUED | OUTPATIENT
Start: 2025-07-23 | End: 2025-07-23

## 2025-07-23 RX ORDER — LIDOCAINE HYDROCHLORIDE 20 MG/ML
INJECTION, SOLUTION EPIDURAL; INFILTRATION; INTRACAUDAL; PERINEURAL AS NEEDED
Status: DISCONTINUED | OUTPATIENT
Start: 2025-07-23 | End: 2025-07-23

## 2025-07-23 RX ORDER — PROPOFOL 10 MG/ML
INJECTION, EMULSION INTRAVENOUS AS NEEDED
Status: DISCONTINUED | OUTPATIENT
Start: 2025-07-23 | End: 2025-07-23

## 2025-07-23 RX ORDER — SODIUM CHLORIDE, SODIUM LACTATE, POTASSIUM CHLORIDE, CALCIUM CHLORIDE 600; 310; 30; 20 MG/100ML; MG/100ML; MG/100ML; MG/100ML
125 INJECTION, SOLUTION INTRAVENOUS CONTINUOUS
Status: ACTIVE | OUTPATIENT
Start: 2025-07-23 | End: 2025-07-23

## 2025-07-23 RX ORDER — MIDAZOLAM HYDROCHLORIDE 2 MG/2ML
INJECTION, SOLUTION INTRAMUSCULAR; INTRAVENOUS AS NEEDED
Status: DISCONTINUED | OUTPATIENT
Start: 2025-07-23 | End: 2025-07-23

## 2025-07-23 RX ORDER — CHLORHEXIDINE GLUCONATE 40 MG/ML
SOLUTION TOPICAL DAILY PRN
Status: DISCONTINUED | OUTPATIENT
Start: 2025-07-23 | End: 2025-07-23 | Stop reason: HOSPADM

## 2025-07-23 RX ORDER — FENTANYL CITRATE 50 UG/ML
INJECTION, SOLUTION INTRAMUSCULAR; INTRAVENOUS AS NEEDED
Status: DISCONTINUED | OUTPATIENT
Start: 2025-07-23 | End: 2025-07-23

## 2025-07-23 RX ORDER — ONDANSETRON 2 MG/ML
INJECTION INTRAMUSCULAR; INTRAVENOUS AS NEEDED
Status: DISCONTINUED | OUTPATIENT
Start: 2025-07-23 | End: 2025-07-23

## 2025-07-23 RX ORDER — CHLORHEXIDINE GLUCONATE ORAL RINSE 1.2 MG/ML
15 SOLUTION DENTAL ONCE
Status: COMPLETED | OUTPATIENT
Start: 2025-07-23 | End: 2025-07-23

## 2025-07-23 RX ORDER — FENTANYL CITRATE/PF 50 MCG/ML
25 SYRINGE (ML) INJECTION
Status: DISCONTINUED | OUTPATIENT
Start: 2025-07-23 | End: 2025-07-23 | Stop reason: HOSPADM

## 2025-07-23 RX ORDER — CEFAZOLIN SODIUM 2 G/50ML
2000 SOLUTION INTRAVENOUS ONCE
Status: COMPLETED | OUTPATIENT
Start: 2025-07-23 | End: 2025-07-23

## 2025-07-23 RX ORDER — ACETAMINOPHEN AND CODEINE PHOSPHATE 300; 30 MG/1; MG/1
1 TABLET ORAL EVERY 6 HOURS PRN
Qty: 12 TABLET | Refills: 0 | Status: SHIPPED | OUTPATIENT
Start: 2025-07-23 | End: 2025-07-26

## 2025-07-23 RX ADMIN — FENTANYL CITRATE 25 MCG: 50 INJECTION INTRAMUSCULAR; INTRAVENOUS at 08:39

## 2025-07-23 RX ADMIN — OXYCODONE HYDROCHLORIDE 5 MG: 5 TABLET ORAL at 08:56

## 2025-07-23 RX ADMIN — PROPOFOL 200 MG: 10 INJECTION, EMULSION INTRAVENOUS at 07:29

## 2025-07-23 RX ADMIN — SODIUM CHLORIDE, SODIUM LACTATE, POTASSIUM CHLORIDE, AND CALCIUM CHLORIDE: .6; .31; .03; .02 INJECTION, SOLUTION INTRAVENOUS at 07:03

## 2025-07-23 RX ADMIN — CHLORHEXIDINE GLUCONATE 15 ML: 1.2 SOLUTION ORAL at 07:16

## 2025-07-23 RX ADMIN — PROPOFOL 100 MCG/KG/MIN: 10 INJECTION, EMULSION INTRAVENOUS at 07:30

## 2025-07-23 RX ADMIN — CEFAZOLIN SODIUM 2000 MG: 2 SOLUTION INTRAVENOUS at 07:28

## 2025-07-23 RX ADMIN — ONDANSETRON 4 MG: 2 INJECTION INTRAMUSCULAR; INTRAVENOUS at 07:57

## 2025-07-23 RX ADMIN — MIDAZOLAM HYDROCHLORIDE 2 MG: 1 INJECTION, SOLUTION INTRAMUSCULAR; INTRAVENOUS at 07:24

## 2025-07-23 RX ADMIN — FENTANYL CITRATE 50 MCG: 50 INJECTION INTRAMUSCULAR; INTRAVENOUS at 07:24

## 2025-07-23 RX ADMIN — DEXAMETHASONE SODIUM PHOSPHATE 10 MG: 10 INJECTION, SOLUTION INTRAMUSCULAR; INTRAVENOUS at 07:34

## 2025-07-23 RX ADMIN — LIDOCAINE HYDROCHLORIDE 100 MG: 20 INJECTION, SOLUTION EPIDURAL; INFILTRATION; INTRACAUDAL at 07:29

## 2025-07-23 RX ADMIN — FENTANYL CITRATE 50 MCG: 50 INJECTION INTRAMUSCULAR; INTRAVENOUS at 07:48

## 2025-07-23 RX ADMIN — FENTANYL CITRATE 25 MCG: 50 INJECTION INTRAMUSCULAR; INTRAVENOUS at 08:29

## 2025-07-23 NOTE — ANESTHESIA POSTPROCEDURE EVALUATION
Post-Op Assessment Note    CV Status:  Stable  Pain Score: 0    Pain management: adequate       Mental Status:  Sleepy   Hydration Status:  Stable   PONV Controlled:  None   Airway Patency:  Patent     Post Op Vitals Reviewed: Yes    No anethesia notable event occurred.    Staff: CRNA           Last Filed PACU Vitals:  Vitals Value Taken Time   Temp 97    Pulse 56 07/23/25 08:17   BP 95/50 07/23/25 08:15   Resp 14 07/23/25 08:17   SpO2 99 % 07/23/25 08:17   Vitals shown include unfiled device data.

## 2025-07-23 NOTE — OP NOTE
OPERATIVE REPORT  PATIENT NAME: Angela Fu    :  1990  MRN: [id number]  Pt Location: [facility] OR ROOM 01    SURGERY DATE: 2025    Surgeons and Role:     * Mendel Howard DO - Primary      Assistant Jayy Meehan PA-C    Preop Diagnosis:  Mass of left hand [R22.32]    Post-Op Diagnosis Codes:     * Mass of left hand [R22.32]    Procedure(s):  Left - LT HAND  MASS EXCISION with size approximately 2 x 2 cm    Specimen(s):  Mass    Estimated Blood Loss:   Minimal    Drains:  None    Anesthesia Type:   LMA with local placed at conclusion of procedure    Operative Indications:  Mass of left hand [R22.32]      Operative Findings:  TT: 13  Size of mass was approximately 2 x 2 cm that appeared to be originating from the tendon sheath       Complications:   None    Procedure and Technique:    The patient was properly identified and brought to the operating suite.  After successful induction of the anesthetic, a tourniquet was placed on the patient's left proximal arm.  The left upper extremity was then prepped and draped in the usual sterile fashion      It was medically necessary that the physician assistant be in the room to aid in positioning and applying the appropriate amount of retraction of the patient.  A qualified resident was not available.  The physician assistant's role was very integral to the status of this case.  He assisted on positioning, draping, retraction of soft tissue, retraction neurovascular bundles, assisted with excision of the mass, closure of the wound, and placement the dressing    An Esmarch was used to exsanguinate the left upper extremity and the tourniquet was then inflated.  Using a #15 scalpel blade, an oblique incision was made directly over the mass at the base of the third finger.  This layers first with down to the subcutaneous fat layer.  Through further blunt dissection, the mass was then located and excised.  It appeared to be a ganglion cyst. the size of the mass was  approximately 2 x 2 cm.  This was sent for pathology.  It appeared to be coming from the tendon sheath.  The tendon sheath along that side was opened up slightly.  the wound was then irrigated.  There is no active any further mass present.  The wound was then dressed with 4-0 nylon and infiltrated with 0.5% Marcaine.  It was then dressed with ointment, Xeroform, 4 x 4's and an Ace bandage.  The tourniquet was then deflated.  There was no complications during the procedure.  She was successfully awoken, transferred to the hospital bed, and went to the recovery room in stable condition     I was present for the entire procedure., A qualified resident physician was not available., and A physician assistant was required during the procedure for retraction, tissue handling, dissection and suturing.    Patient Disposition:  PACU          SIGNATURE: Mendel Howard DO  DATE: July 23, 2025  TIME: 7:14 AM

## 2025-07-23 NOTE — ANESTHESIA PREPROCEDURE EVALUATION
Procedure:  LT HAND THIRD DIGIT MASS EXCISION (Left: Hand)    Relevant Problems   ANESTHESIA (within normal limits)  Does not remember if PONV      CARDIO   (+) Periodic headache syndrome, not intractable   (-) KYLE (dyspnea on exertion)      MUSCULOSKELETAL   (+) Psoriatic arthritis (HCC)      NEURO/PSYCH   (+) Anxiety   (+) Periodic headache syndrome, not intractable   (-) Seizures (HCC)      PULMONARY  Occasional cigarette smoker, daily marijuana use last 7/22   (-) Asthma   (-) URI (upper respiratory infection)      48H Holter (2021):   1) Normal Holter monitor of 48 hrs duration.  2) Normal burden of ventricular and supraventricular ectopic beats.  3) Symptoms correlated with NSR with no significant arrhythmias.      Physical Exam    Airway     Mallampati score: I  TM Distance: >3 FB  Neck ROM: full  Mouth opening: >= 4 cm      Cardiovascular  Cardiovascular exam normal    Dental   No notable dental hx     Pulmonary  Pulmonary exam normal     Neurological    She appears awake and alert.      Other Findings  post-pubertal.      Anesthesia Plan  ASA Score- 2     Anesthesia Type- general with ASA Monitors.         Additional Monitors:     Airway Plan: LMA and LMA.           Plan Factors-Exercise tolerance (METS): >4 METS.    Chart reviewed.   Existing labs reviewed. Patient summary reviewed.    Patient is a current smoker.  Patient instructed to abstain from smoking on day of procedure. Patient did not smoke on day of surgery.            Induction- intravenous.    Postoperative Plan- Plan for postoperative opioid use.   Monitoring Plan - Monitoring plan - standard ASA monitoring  Post Operative Pain Plan - plan for postoperative opioid use and multimodal analgesia        Informed Consent- Anesthetic plan and risks discussed with patient.  I personally reviewed this patient with the CRNA. Discussed and agreed on the Anesthesia Plan with the CRNA..      NPO Status:  Vitals Value Taken Time   Date of last liquid  07/22/25 07/23/25 06:57   Time of last liquid 2230 07/23/25 06:57   Date of last solid 07/22/25 07/23/25 06:57   Time of last solid 1900 07/23/25 06:57

## 2025-07-23 NOTE — INTERVAL H&P NOTE
H&P reviewed. After examining the patient I find no changes in the patients condition since the H&P had been written.    Vitals:    07/23/25 0655   BP: 134/72   Pulse: 61   Resp: 20   Temp: (!) 97.1 °F (36.2 °C)   SpO2: 97%

## 2025-07-23 NOTE — DISCHARGE INSTR - AVS FIRST PAGE
Maintain dressing to left hand for 4 days after surgery then may remove to clean the area.  Dont soak or submerge the incision in water. Allow area to dry then may cover with band aid.    Follow up with Dr Howard as scheduled at last office visit.    Limit lifting with left hand to 3 pounds until seen in office.    Tylenol with codeine for pain control.    Elevate left hand to limit swelling.

## 2025-07-28 PROCEDURE — 88304 TISSUE EXAM BY PATHOLOGIST: CPT | Performed by: PATHOLOGY

## 2025-08-05 ENCOUNTER — OFFICE VISIT (OUTPATIENT)
Dept: OBGYN CLINIC | Facility: CLINIC | Age: 35
End: 2025-08-05

## 2025-08-05 VITALS — HEIGHT: 63 IN | WEIGHT: 204 LBS | BODY MASS INDEX: 36.14 KG/M2

## 2025-08-05 DIAGNOSIS — R22.32 MASS OF LEFT HAND: Primary | ICD-10-CM

## 2025-08-05 PROCEDURE — 99024 POSTOP FOLLOW-UP VISIT: CPT | Performed by: ORTHOPAEDIC SURGERY

## (undated) DEVICE — Device

## (undated) DEVICE — READY WET SKIN SCRUB TRAY-LF: Brand: MEDLINE INDUSTRIES, INC.

## (undated) DEVICE — NEPTUNE E-SEP SMOKE EVACUATION PENCIL, COATED, 70MM BLADE, PUSH BUTTON SWITCH: Brand: NEPTUNE E-SEP

## (undated) DEVICE — STOCKINETTE,IMPERVIOUS,12X48,STERILE: Brand: MEDLINE

## (undated) DEVICE — ASTOUND FABRIC REINFORCED SURGICAL GOWN: Brand: CONVERTORS

## (undated) DEVICE — INTENDED FOR TISSUE SEPARATION, AND OTHER PROCEDURES THAT REQUIRE A SHARP SURGICAL BLADE TO PUNCTURE OR CUT.: Brand: BARD-PARKER ® CARBON RIB-BACK BLADES

## (undated) DEVICE — 3M™ COBAN™ NL STERILE NON-LATEX SELF-ADHERENT WRAP, 2084S, 4 IN X 5 YD (10 CM X 4,5 M), 18 ROLLS/CASE: Brand: 3M™ COBAN™

## (undated) DEVICE — ZIMMER® STERILE DISPOSABLE TOURNIQUET CUFF, DUAL PORT, SINGLE BLADDER, 18 IN. (46 CM)

## (undated) DEVICE — 4-PORT MANIFOLD: Brand: NEPTUNE 2

## (undated) DEVICE — 3M™ DURAPORE™ SURGICAL TAPE 1538-2, 2 INCH X 10 YARD (5CM X 9,1M), 6 ROLLS/BOX: Brand: 3M™ DURAPORE™

## (undated) DEVICE — GAUZE SPONGES,16 PLY: Brand: CURITY

## (undated) DEVICE — BULB SYRINGE, IRRIGATION WITH PROTECTIVE CAP, 60 CC, INDIVIDUALLY WRAPPED: Brand: DOVER

## (undated) DEVICE — OCCLUSIVE GAUZE STRIP,3% BISMUTH TRIBROMOPHENATE IN PETROLATUM BLEND: Brand: XEROFORM

## (undated) DEVICE — STERILE BETHLEHEM PLASTIC HAND: Brand: CARDINAL HEALTH

## (undated) DEVICE — SUT VICRYL 3-0 PS-1 18 IN J683G

## (undated) DEVICE — GARMENT,MEDLINE,DVT,INT,CALF,FOAM,MED: Brand: MEDLINE

## (undated) DEVICE — DECANTER: Brand: UNBRANDED